# Patient Record
Sex: FEMALE | Race: BLACK OR AFRICAN AMERICAN | NOT HISPANIC OR LATINO | URBAN - METROPOLITAN AREA
[De-identification: names, ages, dates, MRNs, and addresses within clinical notes are randomized per-mention and may not be internally consistent; named-entity substitution may affect disease eponyms.]

---

## 2017-01-28 ENCOUNTER — EMERGENCY (EMERGENCY)
Facility: HOSPITAL | Age: 57
LOS: 1 days | Discharge: HOME | End: 2017-01-28
Attending: EMERGENCY MEDICINE | Admitting: EMERGENCY MEDICINE
Payer: COMMERCIAL

## 2017-01-28 VITALS
OXYGEN SATURATION: 98 % | SYSTOLIC BLOOD PRESSURE: 151 MMHG | HEART RATE: 81 BPM | DIASTOLIC BLOOD PRESSURE: 79 MMHG | RESPIRATION RATE: 17 BRPM | TEMPERATURE: 99 F

## 2017-01-28 DIAGNOSIS — G89.29 OTHER CHRONIC PAIN: ICD-10-CM
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
## 2017-01-28 DIAGNOSIS — M25.571 PAIN IN RIGHT ANKLE AND JOINTS OF RIGHT FOOT: ICD-10-CM
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
## 2017-01-28 PROCEDURE — 99283 EMERGENCY DEPT VISIT LOW MDM: CPT

## 2017-01-28 RX ORDER — IBUPROFEN 200 MG
600 TABLET ORAL ONCE
Qty: 0 | Refills: 0 | Status: COMPLETED | OUTPATIENT
Start: 2017-01-28 | End: 2017-01-28

## 2017-01-28 RX ADMIN — Medication 600 MILLIGRAM(S): at 12:45
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
## 2017-02-14 ENCOUNTER — APPOINTMENT (OUTPATIENT)
Dept: ORTHOPEDIC SURGERY | Facility: CLINIC | Age: 57
End: 2017-02-14

## 2017-04-04 ENCOUNTER — EMERGENCY (EMERGENCY)
Facility: HOSPITAL | Age: 57
LOS: 1 days | Discharge: HOME | End: 2017-04-04
Attending: EMERGENCY MEDICINE | Admitting: EMERGENCY MEDICINE
Payer: COMMERCIAL

## 2017-04-04 VITALS
DIASTOLIC BLOOD PRESSURE: 68 MMHG | RESPIRATION RATE: 16 BRPM | OXYGEN SATURATION: 98 % | HEART RATE: 103 BPM | TEMPERATURE: 98 F | SYSTOLIC BLOOD PRESSURE: 99 MMHG

## 2017-04-04 VITALS
HEART RATE: 112 BPM | RESPIRATION RATE: 20 BRPM | TEMPERATURE: 100 F | DIASTOLIC BLOOD PRESSURE: 79 MMHG | OXYGEN SATURATION: 100 % | SYSTOLIC BLOOD PRESSURE: 127 MMHG | HEIGHT: 68 IN

## 2017-04-04 DIAGNOSIS — K21.9 GASTRO-ESOPHAGEAL REFLUX DISEASE WITHOUT ESOPHAGITIS: ICD-10-CM

## 2017-04-04 DIAGNOSIS — R00.0 TACHYCARDIA, UNSPECIFIED: ICD-10-CM

## 2017-04-04 DIAGNOSIS — A09 INFECTIOUS GASTROENTERITIS AND COLITIS, UNSPECIFIED: ICD-10-CM

## 2017-04-04 DIAGNOSIS — R51 HEADACHE: ICD-10-CM

## 2017-04-04 DIAGNOSIS — G43.909 MIGRAINE, UNSPECIFIED, NOT INTRACTABLE, WITHOUT STATUS MIGRAINOSUS: ICD-10-CM

## 2017-04-04 DIAGNOSIS — I10 ESSENTIAL (PRIMARY) HYPERTENSION: ICD-10-CM

## 2017-04-04 DIAGNOSIS — Z88.5 ALLERGY STATUS TO NARCOTIC AGENT: ICD-10-CM

## 2017-04-04 DIAGNOSIS — E03.9 HYPOTHYROIDISM, UNSPECIFIED: ICD-10-CM

## 2017-04-04 LAB
ALBUMIN SERPL ELPH-MCNC: 4.1 G/DL — SIGNIFICANT CHANGE UP (ref 3.3–5)
ALP SERPL-CCNC: 77 U/L — SIGNIFICANT CHANGE UP (ref 40–120)
ALT FLD-CCNC: 15 U/L RC — SIGNIFICANT CHANGE UP (ref 10–45)
ANION GAP SERPL CALC-SCNC: 15 MMOL/L — SIGNIFICANT CHANGE UP (ref 5–17)
APTT BLD: 32.1 SEC — SIGNIFICANT CHANGE UP (ref 27.5–37.4)
AST SERPL-CCNC: 16 U/L — SIGNIFICANT CHANGE UP (ref 10–40)
BASOPHILS # BLD AUTO: 0 K/UL — SIGNIFICANT CHANGE UP (ref 0–0.2)
BASOPHILS NFR BLD AUTO: 0 % — SIGNIFICANT CHANGE UP (ref 0–2)
BILIRUB SERPL-MCNC: 0.6 MG/DL — SIGNIFICANT CHANGE UP (ref 0.2–1.2)
BUN SERPL-MCNC: 16 MG/DL — SIGNIFICANT CHANGE UP (ref 7–23)
CALCIUM SERPL-MCNC: 8.8 MG/DL — SIGNIFICANT CHANGE UP (ref 8.4–10.5)
CHLORIDE SERPL-SCNC: 99 MMOL/L — SIGNIFICANT CHANGE UP (ref 96–108)
CO2 SERPL-SCNC: 25 MMOL/L — SIGNIFICANT CHANGE UP (ref 22–31)
CREAT SERPL-MCNC: 0.77 MG/DL — SIGNIFICANT CHANGE UP (ref 0.5–1.3)
EOSINOPHIL # BLD AUTO: 0 K/UL — SIGNIFICANT CHANGE UP (ref 0–0.5)
EOSINOPHIL NFR BLD AUTO: 0.2 % — SIGNIFICANT CHANGE UP (ref 0–6)
GLUCOSE SERPL-MCNC: 152 MG/DL — HIGH (ref 70–99)
HCT VFR BLD CALC: 38.6 % — SIGNIFICANT CHANGE UP (ref 34.5–45)
HGB BLD-MCNC: 13.2 G/DL — SIGNIFICANT CHANGE UP (ref 11.5–15.5)
INR BLD: 1.22 RATIO — HIGH (ref 0.88–1.16)
LYMPHOCYTES # BLD AUTO: 0.5 K/UL — LOW (ref 1–3.3)
LYMPHOCYTES # BLD AUTO: 8.9 % — LOW (ref 13–44)
MAGNESIUM SERPL-MCNC: 1.8 MG/DL — SIGNIFICANT CHANGE UP (ref 1.6–2.6)
MCHC RBC-ENTMCNC: 31.8 PG — SIGNIFICANT CHANGE UP (ref 27–34)
MCHC RBC-ENTMCNC: 34.2 GM/DL — SIGNIFICANT CHANGE UP (ref 32–36)
MCV RBC AUTO: 93 FL — SIGNIFICANT CHANGE UP (ref 80–100)
MONOCYTES # BLD AUTO: 0.3 K/UL — SIGNIFICANT CHANGE UP (ref 0–0.9)
MONOCYTES NFR BLD AUTO: 5.3 % — SIGNIFICANT CHANGE UP (ref 2–14)
NEUTROPHILS # BLD AUTO: 5.2 K/UL — SIGNIFICANT CHANGE UP (ref 1.8–7.4)
NEUTROPHILS NFR BLD AUTO: 85.5 % — HIGH (ref 43–77)
PHOSPHATE SERPL-MCNC: 3.4 MG/DL — SIGNIFICANT CHANGE UP (ref 2.5–4.5)
PLATELET # BLD AUTO: 193 K/UL — SIGNIFICANT CHANGE UP (ref 150–400)
POTASSIUM SERPL-MCNC: 3.6 MMOL/L — SIGNIFICANT CHANGE UP (ref 3.5–5.3)
POTASSIUM SERPL-SCNC: 3.6 MMOL/L — SIGNIFICANT CHANGE UP (ref 3.5–5.3)
PROT SERPL-MCNC: 7.5 G/DL — SIGNIFICANT CHANGE UP (ref 6–8.3)
PROTHROM AB SERPL-ACNC: 13.4 SEC — HIGH (ref 9.8–12.7)
RBC # BLD: 4.16 M/UL — SIGNIFICANT CHANGE UP (ref 3.8–5.2)
RBC # FLD: 11.7 % — SIGNIFICANT CHANGE UP (ref 10.3–14.5)
SODIUM SERPL-SCNC: 139 MMOL/L — SIGNIFICANT CHANGE UP (ref 135–145)
WBC # BLD: 6.1 K/UL — SIGNIFICANT CHANGE UP (ref 3.8–10.5)
WBC # FLD AUTO: 6.1 K/UL — SIGNIFICANT CHANGE UP (ref 3.8–10.5)

## 2017-04-04 PROCEDURE — 70450 CT HEAD/BRAIN W/O DYE: CPT

## 2017-04-04 PROCEDURE — 84100 ASSAY OF PHOSPHORUS: CPT

## 2017-04-04 PROCEDURE — 70450 CT HEAD/BRAIN W/O DYE: CPT | Mod: 26

## 2017-04-04 PROCEDURE — 99285 EMERGENCY DEPT VISIT HI MDM: CPT

## 2017-04-04 PROCEDURE — 85027 COMPLETE CBC AUTOMATED: CPT

## 2017-04-04 PROCEDURE — 96375 TX/PRO/DX INJ NEW DRUG ADDON: CPT

## 2017-04-04 PROCEDURE — 80053 COMPREHEN METABOLIC PANEL: CPT

## 2017-04-04 PROCEDURE — 96374 THER/PROPH/DIAG INJ IV PUSH: CPT

## 2017-04-04 PROCEDURE — 99284 EMERGENCY DEPT VISIT MOD MDM: CPT | Mod: 25

## 2017-04-04 PROCEDURE — 83735 ASSAY OF MAGNESIUM: CPT

## 2017-04-04 PROCEDURE — 85610 PROTHROMBIN TIME: CPT

## 2017-04-04 PROCEDURE — 85730 THROMBOPLASTIN TIME PARTIAL: CPT

## 2017-04-04 RX ORDER — SODIUM CHLORIDE 9 MG/ML
2000 INJECTION INTRAMUSCULAR; INTRAVENOUS; SUBCUTANEOUS ONCE
Qty: 0 | Refills: 0 | Status: COMPLETED | OUTPATIENT
Start: 2017-04-04 | End: 2017-04-04

## 2017-04-04 RX ORDER — DIPHENHYDRAMINE HCL 50 MG
25 CAPSULE ORAL ONCE
Qty: 0 | Refills: 0 | Status: COMPLETED | OUTPATIENT
Start: 2017-04-04 | End: 2017-04-04

## 2017-04-04 RX ORDER — KETOROLAC TROMETHAMINE 30 MG/ML
15 SYRINGE (ML) INJECTION ONCE
Qty: 0 | Refills: 0 | Status: DISCONTINUED | OUTPATIENT
Start: 2017-04-04 | End: 2017-04-04

## 2017-04-04 RX ORDER — ACETAMINOPHEN 500 MG
1000 TABLET ORAL ONCE
Qty: 0 | Refills: 0 | Status: COMPLETED | OUTPATIENT
Start: 2017-04-04 | End: 2017-04-04

## 2017-04-04 RX ORDER — METOCLOPRAMIDE HCL 10 MG
10 TABLET ORAL ONCE
Qty: 0 | Refills: 0 | Status: COMPLETED | OUTPATIENT
Start: 2017-04-04 | End: 2017-04-04

## 2017-04-04 RX ORDER — ONDANSETRON 8 MG/1
4 TABLET, FILM COATED ORAL ONCE
Qty: 0 | Refills: 0 | Status: COMPLETED | OUTPATIENT
Start: 2017-04-04 | End: 2017-04-04

## 2017-04-04 RX ADMIN — SODIUM CHLORIDE 4000 MILLILITER(S): 9 INJECTION INTRAMUSCULAR; INTRAVENOUS; SUBCUTANEOUS at 20:50

## 2017-04-04 RX ADMIN — Medication 400 MILLIGRAM(S): at 21:25

## 2017-04-04 RX ADMIN — Medication 1000 MILLIGRAM(S): at 22:38

## 2017-04-04 RX ADMIN — Medication 15 MILLIGRAM(S): at 20:51

## 2017-04-04 RX ADMIN — ONDANSETRON 4 MILLIGRAM(S): 8 TABLET, FILM COATED ORAL at 20:50

## 2017-04-04 RX ADMIN — Medication 15 MILLIGRAM(S): at 21:25

## 2017-04-04 RX ADMIN — Medication 10 MILLIGRAM(S): at 20:47

## 2017-04-04 RX ADMIN — Medication 25 MILLIGRAM(S): at 20:48

## 2017-04-04 NOTE — ED PROVIDER NOTE - OBJECTIVE STATEMENT
56 year old female with PMHx of HTN, has had migraines a few times a year in the past, started to get a migraine yesterday which was typical of her usual headaches accompanied by nausea but has not responded to usual over-the-counter medications. Then today she developed 6 episodes of watery diarrhea, nonbloody, and low grade fever. No neck pain, no recent travel, no sick contacts, no blood in stool, no recent antibiotics.

## 2017-04-04 NOTE — ED PROVIDER NOTE - PLAN OF CARE
Take Tylenol 1000 mg or ibuprofen 600 mg every 6 hours with food as needed for pain. Avoid immodium. Drink plenty of fluids and get plenty of rest. Follow up with your primary doctor and with a neurologist - call Dr. Platt for an appointment. Return to the Emergency Dept if you develop any new or worsening symptoms especially inability to keep down fluids or worsening pain

## 2017-04-04 NOTE — ED PROVIDER NOTE - ATTENDING CONTRIBUTION TO CARE
56 year old female otherwise healthy with migraine that started yesterday and diarrhea with fever that started today. Well appearing on exam with tachycardia but benign abdomen and normal neuro exam. Will check labs, treat for migraine and dehydration and reassess

## 2017-04-04 NOTE — ED PROVIDER NOTE - CARE PLAN
Principal Discharge DX:	Migraine without aura and without status migrainosus, not intractable  Secondary Diagnosis:	Diarrhea of presumed infectious origin Principal Discharge DX:	Migraine without aura and without status migrainosus, not intractable  Instructions for follow-up, activity and diet:	Take Tylenol 1000 mg or ibuprofen 600 mg every 6 hours with food as needed for pain. Avoid immodium. Drink plenty of fluids and get plenty of rest. Follow up with your primary doctor and with a neurologist - call Dr. Platt for an appointment. Return to the Emergency Dept if you develop any new or worsening symptoms especially inability to keep down fluids or worsening pain  Secondary Diagnosis:	Diarrhea of presumed infectious origin

## 2017-04-04 NOTE — ED ADULT NURSE NOTE - OBJECTIVE STATEMENT
56 year old female presents to ED complaining of headache x2 days.  Heaviness to head and describes constant sharp feeling starting off at the left side of the neck radiating up the head.  Reports lightheaded, tinnitis, nausea, decreased appetite. Denies photosensitivity, aura. Reports taking Excedrin migraine, with minimal relief.  Reports "watery diarrhea" that started yesterday, today pt has had 6 episodes today. Reports no relief from Pepto Bismol.  Denies dysuria/hematuria. Reports feeling sharp intermittent abd pain around umbilicus.  Pt reports the pain to abd is "sore." Febrile on arrival. Lungs CTA unlabored, no cough, abd soft nondistended, tender to palpation. skin w/d/i. Dry oral mucosa.

## 2018-01-27 ENCOUNTER — EMERGENCY (EMERGENCY)
Facility: HOSPITAL | Age: 58
LOS: 1 days | Discharge: HOME | End: 2018-01-27
Attending: EMERGENCY MEDICINE | Admitting: EMERGENCY MEDICINE
Payer: COMMERCIAL

## 2018-01-27 VITALS
WEIGHT: 240.08 LBS | RESPIRATION RATE: 18 BRPM | DIASTOLIC BLOOD PRESSURE: 80 MMHG | HEIGHT: 70 IN | HEART RATE: 73 BPM | TEMPERATURE: 98 F | OXYGEN SATURATION: 98 % | SYSTOLIC BLOOD PRESSURE: 135 MMHG

## 2018-01-27 LAB
ALBUMIN SERPL ELPH-MCNC: 3.8 G/DL — SIGNIFICANT CHANGE UP (ref 3.3–5.2)
ALP SERPL-CCNC: 70 U/L — SIGNIFICANT CHANGE UP (ref 40–120)
ALT FLD-CCNC: 11 U/L — SIGNIFICANT CHANGE UP
ANION GAP SERPL CALC-SCNC: 13 MMOL/L — SIGNIFICANT CHANGE UP (ref 5–17)
AST SERPL-CCNC: 12 U/L — SIGNIFICANT CHANGE UP
BASOPHILS # BLD AUTO: 0 K/UL — SIGNIFICANT CHANGE UP (ref 0–0.2)
BASOPHILS NFR BLD AUTO: 0.3 % — SIGNIFICANT CHANGE UP (ref 0–2)
BILIRUB SERPL-MCNC: 0.4 MG/DL — SIGNIFICANT CHANGE UP (ref 0.4–2)
BUN SERPL-MCNC: 12 MG/DL — SIGNIFICANT CHANGE UP (ref 8–20)
CALCIUM SERPL-MCNC: 8.9 MG/DL — SIGNIFICANT CHANGE UP (ref 8.6–10.2)
CHLORIDE SERPL-SCNC: 102 MMOL/L — SIGNIFICANT CHANGE UP (ref 98–107)
CO2 SERPL-SCNC: 27 MMOL/L — SIGNIFICANT CHANGE UP (ref 22–29)
CREAT SERPL-MCNC: 0.53 MG/DL — SIGNIFICANT CHANGE UP (ref 0.5–1.3)
EOSINOPHIL # BLD AUTO: 0.2 K/UL — SIGNIFICANT CHANGE UP (ref 0–0.5)
EOSINOPHIL NFR BLD AUTO: 2.6 % — SIGNIFICANT CHANGE UP (ref 0–6)
GLUCOSE SERPL-MCNC: 164 MG/DL — HIGH (ref 70–115)
HCT VFR BLD CALC: 36.8 % — LOW (ref 37–47)
HGB BLD-MCNC: 11.8 G/DL — LOW (ref 12–16)
LYMPHOCYTES # BLD AUTO: 2.2 K/UL — SIGNIFICANT CHANGE UP (ref 1–4.8)
LYMPHOCYTES # BLD AUTO: 32.2 % — SIGNIFICANT CHANGE UP (ref 20–55)
MAGNESIUM SERPL-MCNC: 1.9 MG/DL — SIGNIFICANT CHANGE UP (ref 1.6–2.6)
MCHC RBC-ENTMCNC: 30.5 PG — SIGNIFICANT CHANGE UP (ref 27–31)
MCHC RBC-ENTMCNC: 32.1 G/DL — SIGNIFICANT CHANGE UP (ref 32–36)
MCV RBC AUTO: 95.1 FL — SIGNIFICANT CHANGE UP (ref 81–99)
MONOCYTES # BLD AUTO: 0.3 K/UL — SIGNIFICANT CHANGE UP (ref 0–0.8)
MONOCYTES NFR BLD AUTO: 4.8 % — SIGNIFICANT CHANGE UP (ref 3–10)
NEUTROPHILS # BLD AUTO: 4.1 K/UL — SIGNIFICANT CHANGE UP (ref 1.8–8)
NEUTROPHILS NFR BLD AUTO: 60 % — SIGNIFICANT CHANGE UP (ref 37–73)
PLATELET # BLD AUTO: 226 K/UL — SIGNIFICANT CHANGE UP (ref 150–400)
POTASSIUM SERPL-MCNC: 3.5 MMOL/L — SIGNIFICANT CHANGE UP (ref 3.5–5.3)
POTASSIUM SERPL-SCNC: 3.5 MMOL/L — SIGNIFICANT CHANGE UP (ref 3.5–5.3)
PROT SERPL-MCNC: 6.9 G/DL — SIGNIFICANT CHANGE UP (ref 6.6–8.7)
RBC # BLD: 3.87 M/UL — LOW (ref 4.4–5.2)
RBC # FLD: 12.1 % — SIGNIFICANT CHANGE UP (ref 11–15.6)
SODIUM SERPL-SCNC: 142 MMOL/L — SIGNIFICANT CHANGE UP (ref 135–145)
WBC # BLD: 6.9 K/UL — SIGNIFICANT CHANGE UP (ref 4.8–10.8)
WBC # FLD AUTO: 6.9 K/UL — SIGNIFICANT CHANGE UP (ref 4.8–10.8)

## 2018-01-27 PROCEDURE — 72131 CT LUMBAR SPINE W/O DYE: CPT

## 2018-01-27 PROCEDURE — 36415 COLL VENOUS BLD VENIPUNCTURE: CPT

## 2018-01-27 PROCEDURE — 93971 EXTREMITY STUDY: CPT | Mod: 26,LT

## 2018-01-27 PROCEDURE — 70450 CT HEAD/BRAIN W/O DYE: CPT

## 2018-01-27 PROCEDURE — 80053 COMPREHEN METABOLIC PANEL: CPT

## 2018-01-27 PROCEDURE — 70450 CT HEAD/BRAIN W/O DYE: CPT | Mod: 26

## 2018-01-27 PROCEDURE — 83735 ASSAY OF MAGNESIUM: CPT

## 2018-01-27 PROCEDURE — 99284 EMERGENCY DEPT VISIT MOD MDM: CPT | Mod: 25

## 2018-01-27 PROCEDURE — 93971 EXTREMITY STUDY: CPT

## 2018-01-27 PROCEDURE — 72131 CT LUMBAR SPINE W/O DYE: CPT | Mod: 26

## 2018-01-27 PROCEDURE — 99285 EMERGENCY DEPT VISIT HI MDM: CPT

## 2018-01-27 PROCEDURE — 85027 COMPLETE CBC AUTOMATED: CPT

## 2018-01-27 NOTE — ED ADULT TRIAGE NOTE - CHIEF COMPLAINT QUOTE
pt alert and awake x3 arrived with numbness in left lower leg numbness x1 week and numbness in finger tips with slight HA

## 2018-01-27 NOTE — ED PROVIDER NOTE - OBJECTIVE STATEMENT
57 year old female with PMH HTn and hypothyroid presents with paresthesias and numbness. Sx started 1 week ago in her left leg below the knee, intermittent, no radiation. She reports that Sx improve with walking and no weakness, HA, fever, bowel/bladder incontinence/retention. She does reports that she has had mild swelling to the leg and varicose veins that are new fo the past few weeks.

## 2018-01-27 NOTE — ED PROVIDER NOTE - MEDICAL DECISION MAKING DETAILS
Pt with strength intact, normal sensation and reflexes on exam, steady gait. CTH and L spine neg, labs normal, no DVT, well appearing and stable for dc with neuro follow up.  Pt with no abd pain, weight loss, or vaginal bleeding. Gave her reults of CT which demonstrated pelvic mass and advised urgent outpt f/u

## 2018-01-27 NOTE — ED ADULT NURSE NOTE - OBJECTIVE STATEMENT
numbness tingling intermittent to all 4 extremities w/o weakness. pt denies recent trauma. reports has been dealing with this issue for approx 1 month. pt has no other complaints. a and o x3. breathing even and unlabored. will continue to monitor.

## 2018-01-27 NOTE — ED ADULT NURSE NOTE - CHPI ED SYMPTOMS NEG
no weakness/no nausea/no pain/no dizziness/no fever/no vomiting/no chills/no decreased eating/drinking

## 2019-07-01 NOTE — ED ADULT NURSE NOTE - BREATH SOUNDS, MLM
Clear Acitretin Pregnancy And Lactation Text: This medication is Pregnancy Category X and should not be given to women who are pregnant or may become pregnant in the future. This medication is excreted in breast milk.

## 2019-07-20 ENCOUNTER — EMERGENCY (EMERGENCY)
Facility: HOSPITAL | Age: 59
LOS: 1 days | Discharge: HOME | End: 2019-07-20
Attending: EMERGENCY MEDICINE
Payer: COMMERCIAL

## 2019-07-20 VITALS
WEIGHT: 225.09 LBS | HEART RATE: 92 BPM | RESPIRATION RATE: 18 BRPM | TEMPERATURE: 98 F | SYSTOLIC BLOOD PRESSURE: 154 MMHG | DIASTOLIC BLOOD PRESSURE: 90 MMHG | OXYGEN SATURATION: 99 % | HEIGHT: 68 IN

## 2019-07-20 DIAGNOSIS — N94.9 UNSPECIFIED CONDITION ASSOCIATED WITH FEMALE GENITAL ORGANS AND MENSTRUAL CYCLE: ICD-10-CM

## 2019-07-20 PROBLEM — K21.9 GASTRO-ESOPHAGEAL REFLUX DISEASE WITHOUT ESOPHAGITIS: Chronic | Status: ACTIVE | Noted: 2017-01-28

## 2019-07-20 LAB
ALBUMIN SERPL ELPH-MCNC: 4.1 G/DL — SIGNIFICANT CHANGE UP (ref 3.3–5)
ALP SERPL-CCNC: 63 U/L — SIGNIFICANT CHANGE UP (ref 40–120)
ALT FLD-CCNC: 10 U/L — SIGNIFICANT CHANGE UP (ref 10–45)
ANION GAP SERPL CALC-SCNC: 14 MMOL/L — SIGNIFICANT CHANGE UP (ref 5–17)
APPEARANCE UR: CLEAR — SIGNIFICANT CHANGE UP
APTT BLD: 29.7 SEC — SIGNIFICANT CHANGE UP (ref 27.5–36.3)
AST SERPL-CCNC: 11 U/L — SIGNIFICANT CHANGE UP (ref 10–40)
BACTERIA # UR AUTO: NEGATIVE — SIGNIFICANT CHANGE UP
BASOPHILS # BLD AUTO: 0 K/UL — SIGNIFICANT CHANGE UP (ref 0–0.2)
BASOPHILS NFR BLD AUTO: 0 % — SIGNIFICANT CHANGE UP (ref 0–2)
BILIRUB SERPL-MCNC: 0.4 MG/DL — SIGNIFICANT CHANGE UP (ref 0.2–1.2)
BILIRUB UR-MCNC: NEGATIVE — SIGNIFICANT CHANGE UP
BLD GP AB SCN SERPL QL: NEGATIVE — SIGNIFICANT CHANGE UP
BUN SERPL-MCNC: 17 MG/DL — SIGNIFICANT CHANGE UP (ref 7–23)
CALCIUM SERPL-MCNC: 8.8 MG/DL — SIGNIFICANT CHANGE UP (ref 8.4–10.5)
CHLORIDE SERPL-SCNC: 101 MMOL/L — SIGNIFICANT CHANGE UP (ref 96–108)
CO2 SERPL-SCNC: 26 MMOL/L — SIGNIFICANT CHANGE UP (ref 22–31)
COLOR SPEC: SIGNIFICANT CHANGE UP
CREAT SERPL-MCNC: 0.72 MG/DL — SIGNIFICANT CHANGE UP (ref 0.5–1.3)
DIFF PNL FLD: NEGATIVE — SIGNIFICANT CHANGE UP
EOSINOPHIL # BLD AUTO: 0.1 K/UL — SIGNIFICANT CHANGE UP (ref 0–0.5)
EOSINOPHIL NFR BLD AUTO: 1.8 % — SIGNIFICANT CHANGE UP (ref 0–6)
EPI CELLS # UR: 0 /HPF — SIGNIFICANT CHANGE UP
GAS PNL BLDV: SIGNIFICANT CHANGE UP
GLUCOSE SERPL-MCNC: 158 MG/DL — HIGH (ref 70–99)
GLUCOSE UR QL: NEGATIVE — SIGNIFICANT CHANGE UP
HCG UR QL: NEGATIVE — SIGNIFICANT CHANGE UP
HCT VFR BLD CALC: 38.5 % — SIGNIFICANT CHANGE UP (ref 34.5–45)
HGB BLD-MCNC: 12.9 G/DL — SIGNIFICANT CHANGE UP (ref 11.5–15.5)
HYALINE CASTS # UR AUTO: 0 /LPF — SIGNIFICANT CHANGE UP (ref 0–2)
INR BLD: 1.03 RATIO — SIGNIFICANT CHANGE UP (ref 0.88–1.16)
KETONES UR-MCNC: NEGATIVE — SIGNIFICANT CHANGE UP
LEUKOCYTE ESTERASE UR-ACNC: NEGATIVE — SIGNIFICANT CHANGE UP
LIDOCAIN IGE QN: 16 U/L — SIGNIFICANT CHANGE UP (ref 7–60)
LYMPHOCYTES # BLD AUTO: 1.9 K/UL — SIGNIFICANT CHANGE UP (ref 1–3.3)
LYMPHOCYTES # BLD AUTO: 30.2 % — SIGNIFICANT CHANGE UP (ref 13–44)
MCHC RBC-ENTMCNC: 31.5 PG — SIGNIFICANT CHANGE UP (ref 27–34)
MCHC RBC-ENTMCNC: 33.4 GM/DL — SIGNIFICANT CHANGE UP (ref 32–36)
MCV RBC AUTO: 94.2 FL — SIGNIFICANT CHANGE UP (ref 80–100)
MONOCYTES # BLD AUTO: 0.4 K/UL — SIGNIFICANT CHANGE UP (ref 0–0.9)
MONOCYTES NFR BLD AUTO: 7 % — SIGNIFICANT CHANGE UP (ref 2–14)
NEUTROPHILS # BLD AUTO: 3.9 K/UL — SIGNIFICANT CHANGE UP (ref 1.8–7.4)
NEUTROPHILS NFR BLD AUTO: 61 % — SIGNIFICANT CHANGE UP (ref 43–77)
NITRITE UR-MCNC: NEGATIVE — SIGNIFICANT CHANGE UP
PH UR: 6.5 — SIGNIFICANT CHANGE UP (ref 5–8)
PLATELET # BLD AUTO: 263 K/UL — SIGNIFICANT CHANGE UP (ref 150–400)
POTASSIUM SERPL-MCNC: 3.7 MMOL/L — SIGNIFICANT CHANGE UP (ref 3.5–5.3)
POTASSIUM SERPL-SCNC: 3.7 MMOL/L — SIGNIFICANT CHANGE UP (ref 3.5–5.3)
PROT SERPL-MCNC: 7 G/DL — SIGNIFICANT CHANGE UP (ref 6–8.3)
PROT UR-MCNC: NEGATIVE — SIGNIFICANT CHANGE UP
PROTHROM AB SERPL-ACNC: 11.8 SEC — SIGNIFICANT CHANGE UP (ref 10–12.9)
RBC # BLD: 4.09 M/UL — SIGNIFICANT CHANGE UP (ref 3.8–5.2)
RBC # FLD: 11.2 % — SIGNIFICANT CHANGE UP (ref 10.3–14.5)
RBC CASTS # UR COMP ASSIST: 1 /HPF — SIGNIFICANT CHANGE UP (ref 0–4)
RH IG SCN BLD-IMP: POSITIVE — SIGNIFICANT CHANGE UP
SODIUM SERPL-SCNC: 141 MMOL/L — SIGNIFICANT CHANGE UP (ref 135–145)
SP GR SPEC: 1.02 — SIGNIFICANT CHANGE UP (ref 1.01–1.02)
UROBILINOGEN FLD QL: NEGATIVE — SIGNIFICANT CHANGE UP
WBC # BLD: 6.4 K/UL — SIGNIFICANT CHANGE UP (ref 3.8–10.5)
WBC # FLD AUTO: 6.4 K/UL — SIGNIFICANT CHANGE UP (ref 3.8–10.5)
WBC UR QL: 0 /HPF — SIGNIFICANT CHANGE UP (ref 0–5)

## 2019-07-20 PROCEDURE — 74177 CT ABD & PELVIS W/CONTRAST: CPT | Mod: 26

## 2019-07-20 PROCEDURE — 93975 VASCULAR STUDY: CPT | Mod: 26

## 2019-07-20 PROCEDURE — 76856 US EXAM PELVIC COMPLETE: CPT | Mod: 26,59

## 2019-07-20 PROCEDURE — 93976 VASCULAR STUDY: CPT | Mod: 26,59

## 2019-07-20 PROCEDURE — 99284 EMERGENCY DEPT VISIT MOD MDM: CPT

## 2019-07-20 PROCEDURE — 99244 OFF/OP CNSLTJ NEW/EST MOD 40: CPT

## 2019-07-20 PROCEDURE — 76830 TRANSVAGINAL US NON-OB: CPT | Mod: 26

## 2019-07-20 RX ORDER — ACETAMINOPHEN 500 MG
975 TABLET ORAL ONCE
Refills: 0 | Status: COMPLETED | OUTPATIENT
Start: 2019-07-20 | End: 2019-07-20

## 2019-07-20 RX ORDER — SODIUM CHLORIDE 9 MG/ML
1000 INJECTION, SOLUTION INTRAVENOUS ONCE
Refills: 0 | Status: COMPLETED | OUTPATIENT
Start: 2019-07-20 | End: 2019-07-20

## 2019-07-20 RX ADMIN — SODIUM CHLORIDE 1000 MILLILITER(S): 9 INJECTION, SOLUTION INTRAVENOUS at 13:35

## 2019-07-20 RX ADMIN — Medication 975 MILLIGRAM(S): at 13:35

## 2019-07-20 NOTE — ED PROVIDER NOTE - OBJECTIVE STATEMENT
59 year old female with PMHx of fibroids, HTN pw cc of flank pain    R flank pain radiating to the front  No hx of kidney stone.   No n/V/D. No burning on urination, no increased or decreased frequency. no blood in urine.  Menopausal for 15+ years.  Took ibuprofen this Am. 59 year old female with PMHx of fibroids, HTN pw cc of flank pain    R flank pain radiating to the front, waxing and waning.   No hx of kidney stone.   No n/V/D. No burning on urination, no increased or decreased frequency. no blood in urine.  Menopausal for 15+ years.  Took ibuprofen this Am.  No surgeries.

## 2019-07-20 NOTE — ED ADULT NURSE REASSESSMENT NOTE - NS ED NURSE REASSESS COMMENT FT1
Pt received from [facility]. Pt A&O x 3. Denies pain or any other symptom currently. Waiting for OBGYN recs and dispo. Family at bedside.

## 2019-07-20 NOTE — ED ADULT NURSE NOTE - OBJECTIVE STATEMENT
60 y/o female PMH HTN presents to ED reporting R sided flank pain. Pt reports pain began today, 10/10, R flank, radiating to RLQ of abdomen. On exam, AOx3, speaking in complete sentences. Lung sounds CTA, NAD. Abdomen soft, tender RLQ, non-distended. Pt denies CP, SOB, n/v/d, fever/chills and urinary symptoms at this time. Heplock placed, labs sent. Awaiting US at this time, pt aware of plan of care at this time.

## 2019-07-20 NOTE — ED PROVIDER NOTE - NSFOLLOWUPINSTRUCTIONS_ED_ALL_ED_FT
You were evaluated in the ER at [facility] for abdominal pain. The ultrasound and CT scan of your pelvis showed a large right sided adnexal mass and good blood flow to your ovaries. Your lab results were within normal limits. You were seen by the gynecology service in the hospital who recommended outpatient follow up with your gynecologist for the right adnexal mass.     - Please return to the ER if you develop worsening abdominal pain, nausea/vomiting, fevers/chills, inability to pass flatus or have bowel movements, shortness of breath, lightheadedness/dizziness.     - Please follow up with your gynecologist in the office this week. You were evaluated in the ER at [facility] for abdominal pain. The ultrasound and CT scan of your pelvis showed a large right sided adnexal mass and good blood flow to your ovaries. Your lab results were within normal limits. You were seen by the gynecology service in the hospital who recommended outpatient follow up with your gynecologist for the right adnexal mass.     - Please return to the ER if you develop worsening abdominal pain, nausea/vomiting, fevers/chills, inability to pass flatus or have bowel movements, shortness of breath, lightheadedness/dizziness.     - Please follow up with your gynecologist in the office this week.    - Please excuse Ms. Snowden from her work duties until thursday 7/25/19 so she may have appropriate outpatient medical evaluation this week

## 2019-07-20 NOTE — ED ADULT NURSE NOTE - NSIMPLEMENTINTERV_GEN_ALL_ED
Implemented All Universal Safety Interventions:  [place] to call system. Call bell, personal items and telephone within reach. Instruct patient to call for assistance. Room bathroom lighting operational. Non-slip footwear when patient is off stretcher. Physically safe environment: no spills, clutter or unnecessary equipment. Stretcher in lowest position, wheels locked, appropriate side rails in place.

## 2019-07-20 NOTE — CONSULT NOTE ADULT - SUBJECTIVE AND OBJECTIVE BOX
HPI    59y postmenopausal woman (LMP 20y ago) who presents w/ RLQ pain since Thursday. States pain first began Thursday, sharp, intermittent in nature. Took motrin, improved. Then upon waking up this am (~6a), increasingly painful, 10/10. On further questioning states experienced a similar pain, however much more mild and more intermittent x past few months. Denies any associated N/V, fevers, chills, SOB, CP, VB, etc. Last ate 7p last night.     OBGYN Provider: Patient unsure of name, Located in [place]    OB/GYN HISTORY: Fibroids  PMH:Acid reflux, Hyperthyroid, HTN (Hypertension)  PSH: Denies  Rx: Methimazole, Hydrochorothiazide, Norvasc  Allergies: NKDA  Intolerance: Percocet 10/325 (N/V, dizziness)  Psych: Denies  Social: Denies      CONSTITUTIONAL: No weakness, fevers or chills  EYES/ENT: No visual changes;  No vertigo or throat pain   NECK: No pain or stiffness  RESPIRATORY: No cough, wheezing, hemoptysis; No shortness of breath  CARDIOVASCULAR: No chest pain or palpitations  GASTROINTESTINAL: No abdominal or epigastric pain. No nausea, vomiting, or hematemesis; No diarrhea or constipation. No melena or hematochezia.  GENITOURINARY: No dysuria, frequency or hematuria  NEUROLOGICAL: No numbness or weakness  SKIN: No itching, burning, rashes, or lesions   All other review of systems is negative unless indicated above.    Vital Signs Last 24 Hrs  T(C): 36.6 (2019 19:34), Max: 36.7 (2019 12:37)  T(F): 97.8 (2019 19:34), Max: 98 (2019 12:37)  HR: 61 (2019 19:34) (61 - 92)  BP: 134/82 (2019 19:34) (125/80 - 154/90)  BP(mean): --  RR: 20 (2019 19:34) (18 - 20)  SpO2: 98% (2019 19:34) (98% - 99%)    PHYSICAL EXAM:  Constitutional: alert and oriented x 3  Respiratory: CTA bilaterally  Cardiovascular: regular rate and rhythm  Gastrointestinal: soft, minimally tender to palpation in RLQ, No rebound or guarding  Genitourinary: NEFG  Cervix: closed/ long, no CMT  Uterus: normal size, non tender  Adnexa: L adnexa minimally tender, no palpable masses      LABS:                        12.9   6.4   )-----------( 263      ( 2019 13:36 )             38.5     07-20    141  |  101  |  17  ----------------------------<  158<H>  3.7   |  26  |  0.72    Ca    8.8      2019 13:36    TPro  7.0  /  Alb  4.1  /  TBili  0.4  /  DBili  x   /  AST  11  /  ALT  10  /  AlkPhos  63      PT/INR - ( 2019 13:36 )   PT: 11.8 sec;   INR: 1.03 ratio         PTT - ( 2019 13:36 )  PTT:29.7 sec  Urinalysis Basic - ( 2019 15:10 )    Color: Light Yellow / Appearance: Clear / S.019 / pH: x  Gluc: x / Ketone: Negative  / Bili: Negative / Urobili: Negative   Blood: x / Protein: Negative / Nitrite: Negative   Leuk Esterase: Negative / RBC: 1 /hpf / WBC 0 /HPF   Sq Epi: x / Non Sq Epi: 0 /hpf / Bacteria: Negative        Blood Type: B Positive      RADIOLOGY & ADDITIONAL STUDIES:    EXAM:  US TRANSVAGINAL                          EXAM:  US DPLX PELVIC                            PROCEDURE DATE:  2019            INTERPRETATION:  CLINICAL INFORMATION: Right lower quadrant abdominal   pain. Evaluate for torsion versus fibroid. Postmenopausal. Right pelvic   pain.  LMP: Unknown    COMPARISON: Pelvic sonogram from 2010.    TECHNIQUE:     Endovaginal pelvic sonogram as per order. Transabdominal pelvic sonogram   was also performed as part of our standard protocol. Color and Spectral   Doppler was performed.    FINDINGS:    Uterus: 5.4 x 2.9 x 4.7 cm. Within normal limits.    Endometrium: 3 mm. Within normal limits.    Right ovary: Not visualized.    Left ovary: 0.7 x 1.1 x 0.6 cm. Normal arterial and venous waveforms.    Fluid: Moderate volume of pelvic free fluid with debris.    IMPRESSION:    Moderate volume pelvic free fluid limits evaluation.    Right ovary was not visualized.                DEVAN ORR M.D., RADIOLOGY RESIDENT  This document has been electronically signed.  CLAU MANCINI M.D., ATTENDING RADIOLOGIST  This document has been electronically signed. 2019  2:52PM      EXAM:  CT ABDOMEN AND PELVIS IC                            PROCEDURE DATE:  2019            INTERPRETATION:  CLINICAL INFORMATION: Right-sided pain    COMPARISON: Ultrasound of same date    PROCEDURE:   CT of the Abdomen and Pelvis was performed with intravenous contrast.   Intravenous contrast: 90 ml Omnipaque 350. 10 ml discarded.  Oral contrast: None.  Sagittal and coronal reformats were performed.    FINDINGS:    LOWER CHEST: Within normal limits.    LIVER: Within normal limits. Small amount of perihepatic ascites noted.  BILE DUCTS: Normal caliber.  GALLBLADDER: Within normal limits.  SPLEEN: Within normal limits.  PANCREAS: Within normal limits.  ADRENALS: Within normal limits.  KIDNEYS/URETERS: Bilateral renal hypodensities noted which are too small   to characterize. No stones or hydronephrosis.    BLADDER: Within normal limits.  REPRODUCTIVE ORGANS: Right adnexal mass measuring approximately 9.3 x 9.7   cm. Small amount of pelvic ascites noted.    BOWEL: No bowel obstruction. Appendix normal  PERITONEUM: No ascites.  VESSELS:  Within normal limits.  RETROPERITONEUM: No lymphadenopathy.    ABDOMINAL WALL: Within normal limits.  BONES: Within normal limits.    IMPRESSION:     Normal appendix    9.3 x 9.7 cm right adnexal mass. Differential of this includes ovarian   mass or torsed right ovary given history of right lower quadrant pain.    The findings were discussed with Dr. Rubin at time of final signing.                     CLAU MANCINI M.D., ATTENDING RADIOLOGIST  This document has been electronically signed. 2019  4:14PM

## 2019-07-20 NOTE — ED ADULT NURSE REASSESSMENT NOTE - NS ED NURSE REASSESS COMMENT FT1
Pt requesting to eat at this time, advised not to at this time per RN. Awaiting CT scan at this time, pt aware of plan of care at this time.

## 2019-07-20 NOTE — ED PROVIDER NOTE - ATTENDING CONTRIBUTION TO CARE
I have seen and evaluated this patient with the resident.   I agree with the findings  unless other wise stated.  I have made appropriate changes in documentations where needed, After my face to face bedside evaluation, I am further  [age] year old female with PMHx of fibroids, HTN pw cc of flank pain R flank pain radiating to the front, waxing and waning. No hx of kidney stone. No n/V/D. No burning on urination, no increased or decreased frequency. no blood in urine. Menopausal for 15+ years.  Took ibuprofen this Am. No surgeries. abdomen tender RLQ mass palpable RLQ No cva tenderness see detailed exam concern for ovarian/uterine problem v/s appendicitis labs US reviewed will get CT abdomen GYN eval outpatient f/u --Parsons

## 2019-07-20 NOTE — ED PROVIDER NOTE - CARE PLAN
Principal Discharge DX:	Adnexal mass  Assessment and plan of treatment:	59 year old female with PMHx of fibroids, HTN pw cc of flank pain. In context of large fibroid in past likely 2/2 fibroid, will get TVUS, labs, pain control and reassess. vss clinically stable

## 2019-07-20 NOTE — CONSULT NOTE ADULT - ASSESSMENT
59y postmenopausal woman (LMP 20y ago) who presents w/ RLQ pain since Thursday. VSS and wnl. PE significant for mild TTP in RLQ, however no peritoneal signs. Imaging significant for R adnexal mass (9.3x9.7cm). Cannot r/o torsion.

## 2019-07-20 NOTE — CONSULT NOTE ADULT - ATTENDING COMMENTS
ATTG on service     Pt seen with PGY2 and PGY4 - agree with above plan and mngt    In summary, pt is 58 yo postmenopausal x 17 yrs presents with sudden onset RLQ pain - started 3 days ago but became worse today 10/10 - intermittent, not relieved with Motrin.  Reports similar milder pain started months ago but no w/u or eval.  No prior contributing hx.  No associated sxs - denies anorexia, constipation, f/c, urinary sxs, vaginal bleeding.  Mild nasuea - NPO since 7pm last night.  Remote h/O small myoma - no medical or surgical mngt.     VSS, afebrile  NAD  ABd-soft, nd, nt, +RLQ TTP    Hct 38  TVS-as above - unable to see right adnexa, moderate pelvic fluid  CT Abd pelvis - large 9 cm right adnexal mass, small pelvic ascites    a/p:58 yo postmenopausal with new onset RLQ pain and newly found large adnexal mass.  Pain controlled for last 7 hrs with Tylenol only.  No acute abdomen.  Hct stable on admission.  TVS - unable to assess right adenxa - cannot r/o torsion since not seen.  Moderate fluid.  -recommend rpt Hct to see if signs of ruptured cyst  -rpt u/s - both abdominal and vaginal to assess right adnexa with Dopplers  -call for rpt exam if pain meds requested    -if no signs of torsion and pain still controlled with po - can dc pt to home for GYN f/u and surgery with w/u for adnexal mass  -if pain returns or Hct drops or signs of torsion - then will consider urgent surgical intervention   -GYN will cont to f/u    JESUS Santana

## 2019-07-20 NOTE — ED PROVIDER NOTE - NS ED ROS FT
CONSTITUTIONAL: No fevers, no chills  Eyes: No vision changes  Cardiovascular: No Chest pain  Respiratory: No SOB  Gastrointestinal: refer to HPI  Genitourinary: no dysuria, no hematuria  SKIN: no rashes.  NEURO: no headache, no weakness or numbness  PSYCHIATRIC: no known mental health issues.

## 2019-07-20 NOTE — ED PROVIDER NOTE - PHYSICAL EXAMINATION
General: well appearing, interactive, well nourished, NAD  HEENT: pupils equal and reactive, normal oropharynx, normal external ears bilaterally   Cardiac: RRR, no MRG appreciated  Resp: lungs clear to auscultation bilaterally, symmetric chest wall rise  Abd: soft, ttp R lower pelvis area, nondistended, normoactive bowel sounds  : no CVA tenderness  Neuro: Moving all extremities  Skin:  normal color for race  Pelvic exam performed with chaperone Eaton: minimal amount of discharge, no CMT tenderness, some R adnexal tenderness

## 2019-07-20 NOTE — ED PROVIDER NOTE - PLAN OF CARE
59 year old female with PMHx of fibroids, HTN pw cc of flank pain. In context of large fibroid in past likely 2/2 fibroid, will get TVUS, labs, pain control and reassess. vss clinically stable

## 2019-07-20 NOTE — ED PROVIDER NOTE - PROGRESS NOTE DETAILS
Repeat US demosntrating flow to the R ovary. RLQ pain much improved after tylenol and now non-tender on exam. GYN was conatcted and returned to bedside to al. Daniel dc home with outpatient f/u with her GYN.

## 2019-07-20 NOTE — CONSULT NOTE ADULT - PROBLEM SELECTOR RECOMMENDATION 9
Patient to be further evaluated by  prior to final recommendations.    Prabha Cummings, PGY2  D/W Dr. Santana

## 2019-07-21 VITALS
DIASTOLIC BLOOD PRESSURE: 58 MMHG | TEMPERATURE: 98 F | HEART RATE: 60 BPM | OXYGEN SATURATION: 97 % | RESPIRATION RATE: 18 BRPM | SYSTOLIC BLOOD PRESSURE: 117 MMHG

## 2019-07-21 LAB
BASOPHILS # BLD AUTO: 0 K/UL — SIGNIFICANT CHANGE UP (ref 0–0.2)
BASOPHILS NFR BLD AUTO: 0.1 % — SIGNIFICANT CHANGE UP (ref 0–2)
CULTURE RESULTS: SIGNIFICANT CHANGE UP
EOSINOPHIL # BLD AUTO: 0.2 K/UL — SIGNIFICANT CHANGE UP (ref 0–0.5)
EOSINOPHIL NFR BLD AUTO: 2.8 % — SIGNIFICANT CHANGE UP (ref 0–6)
HCT VFR BLD CALC: 34.8 % — SIGNIFICANT CHANGE UP (ref 34.5–45)
HGB BLD-MCNC: 11.6 G/DL — SIGNIFICANT CHANGE UP (ref 11.5–15.5)
LYMPHOCYTES # BLD AUTO: 2.2 K/UL — SIGNIFICANT CHANGE UP (ref 1–3.3)
LYMPHOCYTES # BLD AUTO: 39.6 % — SIGNIFICANT CHANGE UP (ref 13–44)
MCHC RBC-ENTMCNC: 31.3 PG — SIGNIFICANT CHANGE UP (ref 27–34)
MCHC RBC-ENTMCNC: 33.1 GM/DL — SIGNIFICANT CHANGE UP (ref 32–36)
MCV RBC AUTO: 94.6 FL — SIGNIFICANT CHANGE UP (ref 80–100)
MONOCYTES # BLD AUTO: 0.4 K/UL — SIGNIFICANT CHANGE UP (ref 0–0.9)
MONOCYTES NFR BLD AUTO: 6.8 % — SIGNIFICANT CHANGE UP (ref 2–14)
NEUTROPHILS # BLD AUTO: 2.8 K/UL — SIGNIFICANT CHANGE UP (ref 1.8–7.4)
NEUTROPHILS NFR BLD AUTO: 50.7 % — SIGNIFICANT CHANGE UP (ref 43–77)
PLATELET # BLD AUTO: 221 K/UL — SIGNIFICANT CHANGE UP (ref 150–400)
RBC # BLD: 3.69 M/UL — LOW (ref 3.8–5.2)
RBC # FLD: 11.3 % — SIGNIFICANT CHANGE UP (ref 10.3–14.5)
SPECIMEN SOURCE: SIGNIFICANT CHANGE UP
WBC # BLD: 5.5 K/UL — SIGNIFICANT CHANGE UP (ref 3.8–10.5)
WBC # FLD AUTO: 5.5 K/UL — SIGNIFICANT CHANGE UP (ref 3.8–10.5)

## 2019-07-21 PROCEDURE — 80053 COMPREHEN METABOLIC PANEL: CPT

## 2019-07-21 PROCEDURE — 85730 THROMBOPLASTIN TIME PARTIAL: CPT

## 2019-07-21 PROCEDURE — 85027 COMPLETE CBC AUTOMATED: CPT

## 2019-07-21 PROCEDURE — 82330 ASSAY OF CALCIUM: CPT

## 2019-07-21 PROCEDURE — 76830 TRANSVAGINAL US NON-OB: CPT

## 2019-07-21 PROCEDURE — 74177 CT ABD & PELVIS W/CONTRAST: CPT

## 2019-07-21 PROCEDURE — 81025 URINE PREGNANCY TEST: CPT

## 2019-07-21 PROCEDURE — 81001 URINALYSIS AUTO W/SCOPE: CPT

## 2019-07-21 PROCEDURE — 84132 ASSAY OF SERUM POTASSIUM: CPT

## 2019-07-21 PROCEDURE — 82947 ASSAY GLUCOSE BLOOD QUANT: CPT

## 2019-07-21 PROCEDURE — 83690 ASSAY OF LIPASE: CPT

## 2019-07-21 PROCEDURE — 76856 US EXAM PELVIC COMPLETE: CPT

## 2019-07-21 PROCEDURE — 86850 RBC ANTIBODY SCREEN: CPT

## 2019-07-21 PROCEDURE — 82803 BLOOD GASES ANY COMBINATION: CPT

## 2019-07-21 PROCEDURE — 86900 BLOOD TYPING SEROLOGIC ABO: CPT

## 2019-07-21 PROCEDURE — 86901 BLOOD TYPING SEROLOGIC RH(D): CPT

## 2019-07-21 PROCEDURE — 87086 URINE CULTURE/COLONY COUNT: CPT

## 2019-07-21 PROCEDURE — 85610 PROTHROMBIN TIME: CPT

## 2019-07-21 PROCEDURE — 99284 EMERGENCY DEPT VISIT MOD MDM: CPT

## 2019-07-21 PROCEDURE — 84295 ASSAY OF SERUM SODIUM: CPT

## 2019-07-21 PROCEDURE — 82435 ASSAY OF BLOOD CHLORIDE: CPT

## 2019-07-21 PROCEDURE — 93975 VASCULAR STUDY: CPT

## 2019-07-21 PROCEDURE — 85014 HEMATOCRIT: CPT

## 2019-07-21 PROCEDURE — 83605 ASSAY OF LACTIC ACID: CPT

## 2019-07-21 NOTE — CHART NOTE - NSCHARTNOTEFT_GEN_A_CORE
Patient had a repeat CBC and TAUS. CBC stable status post 1L bolus. TAUS visualizes the adnexal mass previously seen on CT, normal flow to right ovary. Patient has not required anymore pain meds since admission. VSS. Findings discussed with patient and her family at [facility]. Advised patient it is concerning for a mass to be this large in a person at her age. However, given the findings and the resolution of her pain, emergent surgery is not indicated at this time. Patient is aware she will require further work-up outpatient for potential malignancy, including but not limited to Tumor markers, possible MRI, and consultation with a GYN oncologist. Plan for surgery to be established during work-up.    All questions answered. Patient to see Dr. Huffman in the office on Monday evening or Tuesday night.     Patient seen w/ Dr. Bala Tasi PGY-2 Patient had a repeat CBC and TAUS. CBC stable status post 1L bolus. TAUS visualizes the adnexal mass previously seen on CT, normal flow to right ovary. Patient has not required anymore pain meds since admission. VSS. Findings discussed with patient and her family at [facility]. Advised patient it is concerning for a mass to be this large in a person at her age. However, given the findings and the resolution of her pain, emergent surgery is not indicated at this time. Patient is aware she will require further work-up outpatient for potential malignancy, including but not limited to Tumor markers, possible MRI, and consultation with a GYN oncologist. Plan for surgery to be established during work-up.    All questions answered. Patient to see Dr. Huffman in the office on Monday evening or Tuesday night.     Patient seen w/ Dr. Armida Tsai PGY-2    OB attending   patient seen and examined at bedside. agree with above assessment and plan

## 2019-07-22 ENCOUNTER — APPOINTMENT (OUTPATIENT)
Dept: OBGYN | Facility: CLINIC | Age: 59
End: 2019-07-22
Payer: COMMERCIAL

## 2019-07-22 VITALS
WEIGHT: 234 LBS | BODY MASS INDEX: 34.66 KG/M2 | DIASTOLIC BLOOD PRESSURE: 80 MMHG | HEART RATE: 73 BPM | SYSTOLIC BLOOD PRESSURE: 153 MMHG | HEIGHT: 69 IN

## 2019-07-22 DIAGNOSIS — Z01.411 ENCOUNTER FOR GYNECOLOGICAL EXAMINATION (GENERAL) (ROUTINE) WITH ABNORMAL FINDINGS: ICD-10-CM

## 2019-07-22 DIAGNOSIS — R19.00 INTRA-ABDOMINAL AND PELVIC SWELLING, MASS AND LUMP, UNSPECIFIED SITE: ICD-10-CM

## 2019-07-22 PROCEDURE — 99386 PREV VISIT NEW AGE 40-64: CPT

## 2019-07-22 RX ORDER — IPRATROPIUM BROMIDE 42 UG/1
0.06 SPRAY NASAL
Qty: 15 | Refills: 0 | Status: ACTIVE | COMMUNITY
Start: 2019-07-12

## 2019-07-22 RX ORDER — METFORMIN HYDROCHLORIDE 500 MG/1
500 TABLET, COATED ORAL
Qty: 60 | Refills: 0 | Status: ACTIVE | COMMUNITY
Start: 2019-01-16

## 2019-07-22 RX ORDER — NAPROXEN SODIUM 550 MG/1
550 TABLET ORAL
Qty: 60 | Refills: 0 | Status: COMPLETED | COMMUNITY
Start: 2019-04-29

## 2019-07-22 RX ORDER — AMOXICILLIN 875 MG/1
875 TABLET, FILM COATED ORAL
Qty: 20 | Refills: 0 | Status: COMPLETED | COMMUNITY
Start: 2019-07-12 | End: 2019-07-22

## 2019-07-22 RX ORDER — IBUPROFEN 800 MG/1
800 TABLET, FILM COATED ORAL
Qty: 40 | Refills: 0 | Status: COMPLETED | COMMUNITY
Start: 2018-11-06

## 2019-07-22 RX ORDER — HYDROCHLOROTHIAZIDE 25 MG/1
25 TABLET ORAL
Qty: 90 | Refills: 0 | Status: ACTIVE | COMMUNITY
Start: 2019-01-14

## 2019-07-22 RX ORDER — AMLODIPINE BESYLATE 10 MG/1
10 TABLET ORAL
Qty: 90 | Refills: 0 | Status: ACTIVE | COMMUNITY
Start: 2018-11-03

## 2019-07-22 RX ORDER — POTASSIUM CHLORIDE 1500 MG/1
20 TABLET, EXTENDED RELEASE ORAL
Qty: 180 | Refills: 0 | Status: ACTIVE | COMMUNITY
Start: 2019-04-29

## 2019-07-22 RX ORDER — OMEPRAZOLE 40 MG/1
40 CAPSULE, DELAYED RELEASE ORAL
Qty: 180 | Refills: 0 | Status: ACTIVE | COMMUNITY
Start: 2019-01-22

## 2019-07-22 RX ORDER — MELOXICAM 15 MG/1
15 TABLET ORAL
Qty: 30 | Refills: 0 | Status: ACTIVE | COMMUNITY
Start: 2018-11-06

## 2019-07-22 RX ORDER — METHIMAZOLE 10 MG/1
10 TABLET ORAL
Qty: 30 | Refills: 0 | Status: ACTIVE | COMMUNITY
Start: 2019-04-19

## 2019-07-22 NOTE — HISTORY OF PRESENT ILLNESS
[___ Year(s) Ago] : [unfilled] year(s) ago [Unsure Pap Smear] : uncertain timing of her last Papanicolaou cytology [Last Mammogram ___] : last mammogram done [unfilled] [No Previous CRC Screening] : no previous screening [Lower-Rt-Q] : lower right quadrant [Continuous] : continuous [___ Months] : started [unfilled] months ago [Fever] : no fever [Nausea] : no nausea [Vomiting] : no vomiting [Diarrhea] : no diarrhea [Vaginal Bleeding] : no vaginal bleeding [Pelvic Pressure] : no pelvic pressure [Dysuria] : no dysuria [Currently In Menopause] : currently in menopause [Post-Menopause, No Sxs] : post-menopausal, currently without symptoms [Menopause Age: ____] : age at menopause was [unfilled] [Sexually Active] : is not sexually active

## 2019-07-25 LAB
CYTOLOGY CVX/VAG DOC THIN PREP: NORMAL
HPV HIGH+LOW RISK DNA PNL CVX: NOT DETECTED

## 2019-07-27 LAB
CANCER AG125 SERPL-ACNC: 7 U/ML
CANCER AG19-9 SERPL-ACNC: 11 U/ML
CEA SERPL-MCNC: 1.5 NG/ML

## 2019-08-01 ENCOUNTER — APPOINTMENT (OUTPATIENT)
Dept: GYNECOLOGIC ONCOLOGY | Facility: CLINIC | Age: 59
End: 2019-08-01
Payer: COMMERCIAL

## 2019-08-01 VITALS
HEART RATE: 78 BPM | SYSTOLIC BLOOD PRESSURE: 153 MMHG | WEIGHT: 229 LBS | HEIGHT: 69 IN | BODY MASS INDEX: 33.92 KG/M2 | DIASTOLIC BLOOD PRESSURE: 90 MMHG

## 2019-08-01 PROCEDURE — 99244 OFF/OP CNSLTJ NEW/EST MOD 40: CPT

## 2019-08-01 NOTE — PHYSICAL EXAM
[Abnormal] : Bimanual Exam: Abnormal [Normal] : Recto-Vaginal Exam: Normal [de-identified] : mobile 8cm adnexal mass

## 2019-08-01 NOTE — DISCUSSION/SUMMARY
[Reviewed Clinical Lab Test(s)] : Results of clinical tests were reviewed. [Pre Op] : The differential diagnosis was discussed in detail. The indications, risks, benefits and alternatives were discussed. [unfilled] expressed an understanding of the treatment rationale and her questions were answered to her apparent satisfaction. [Reviewed Radiology Report(s)] : Radiology reports were reviewed. [FreeTextEntry1] : 60 yo with complex adnexal mass and normal tumor markers\par \par Agree with plan for L/S BSO with possible hysterectomy and staging.  Discussed limitations of frozen section and possibility of false negative result which may warrant return to OR.  Will coordinate surgical scheduling with Dr. Huffman's office.\par

## 2019-08-01 NOTE — HISTORY OF PRESENT ILLNESS
[FreeTextEntry1] : Referred by Dr. Huffman\par \par 60 yo postmenopausal x 27 years, who recently saw Dr. Huffman for initial gyn visit in 7/2019 after being evaluated at [facility] ER for abdominal pain.  Workup in ER revealed a 9cm adnexal mass.  CA-125, CEA, and  are normal.  She is scheduled for a pelvic MRI next week.  She presents today for consultation.  Plan for L/S BSO with Dr. Huffman with frozen section.\par \par Denies dyspnea or chest pain, vaginal bleeding or discharge, nausea/vomiting, changes in bowel habits or urination, lower extremity edema or pain.\par \par \par

## 2019-08-04 ENCOUNTER — FORM ENCOUNTER (OUTPATIENT)
Age: 59
End: 2019-08-04

## 2019-08-05 ENCOUNTER — APPOINTMENT (OUTPATIENT)
Dept: MRI IMAGING | Facility: CLINIC | Age: 59
End: 2019-08-05
Payer: COMMERCIAL

## 2019-08-05 ENCOUNTER — OUTPATIENT (OUTPATIENT)
Dept: OUTPATIENT SERVICES | Facility: HOSPITAL | Age: 59
LOS: 1 days | End: 2019-08-05
Payer: COMMERCIAL

## 2019-08-05 DIAGNOSIS — Z00.8 ENCOUNTER FOR OTHER GENERAL EXAMINATION: ICD-10-CM

## 2019-08-05 PROCEDURE — A9585: CPT

## 2019-08-05 PROCEDURE — 72197 MRI PELVIS W/O & W/DYE: CPT | Mod: 26

## 2019-08-05 PROCEDURE — 72197 MRI PELVIS W/O & W/DYE: CPT

## 2019-08-08 ENCOUNTER — OUTPATIENT (OUTPATIENT)
Dept: OUTPATIENT SERVICES | Facility: HOSPITAL | Age: 59
LOS: 1 days | End: 2019-08-08
Payer: COMMERCIAL

## 2019-08-08 VITALS
WEIGHT: 227.96 LBS | DIASTOLIC BLOOD PRESSURE: 80 MMHG | TEMPERATURE: 99 F | HEIGHT: 68 IN | HEART RATE: 85 BPM | OXYGEN SATURATION: 98 % | SYSTOLIC BLOOD PRESSURE: 140 MMHG | RESPIRATION RATE: 18 BRPM

## 2019-08-08 DIAGNOSIS — K21.9 GASTRO-ESOPHAGEAL REFLUX DISEASE WITHOUT ESOPHAGITIS: ICD-10-CM

## 2019-08-08 DIAGNOSIS — N94.89 OTHER SPECIFIED CONDITIONS ASSOCIATED WITH FEMALE GENITAL ORGANS AND MENSTRUAL CYCLE: ICD-10-CM

## 2019-08-08 DIAGNOSIS — E03.9 HYPOTHYROIDISM, UNSPECIFIED: ICD-10-CM

## 2019-08-08 DIAGNOSIS — E11.9 TYPE 2 DIABETES MELLITUS WITHOUT COMPLICATIONS: ICD-10-CM

## 2019-08-08 DIAGNOSIS — E87.6 HYPOKALEMIA: ICD-10-CM

## 2019-08-08 LAB — HBA1C BLD-MCNC: 6.2 % — HIGH (ref 4–5.6)

## 2019-08-08 PROCEDURE — 93010 ELECTROCARDIOGRAM REPORT: CPT

## 2019-08-08 RX ORDER — AMLODIPINE BESYLATE 2.5 MG/1
1 TABLET ORAL
Qty: 0 | Refills: 0 | DISCHARGE

## 2019-08-08 RX ORDER — PROPRANOLOL HCL 160 MG
0 CAPSULE, EXTENDED RELEASE 24HR ORAL
Qty: 0 | Refills: 0 | DISCHARGE

## 2019-08-08 RX ORDER — POTASSIUM CHLORIDE 20 MEQ
0 PACKET (EA) ORAL
Qty: 0 | Refills: 0 | DISCHARGE

## 2019-08-08 RX ORDER — SODIUM CHLORIDE 9 MG/ML
1000 INJECTION, SOLUTION INTRAVENOUS
Refills: 0 | Status: DISCONTINUED | OUTPATIENT
Start: 2019-08-16 | End: 2019-08-17

## 2019-08-08 RX ORDER — OMEPRAZOLE 10 MG/1
0 CAPSULE, DELAYED RELEASE ORAL
Qty: 0 | Refills: 0 | DISCHARGE

## 2019-08-08 RX ORDER — HYDROCHLOROTHIAZIDE 25 MG
0 TABLET ORAL
Qty: 0 | Refills: 0 | DISCHARGE

## 2019-08-08 NOTE — H&P PST ADULT - ASSESSMENT
60 yo female presents to PST unit with pre-op diagnosis of other specified conditions associated with female genital organs and menstrual cycle scheduled for laparoscopy, bilateral salpingo ophorectomy, possible exploratory, possible total abdominal hysterectomy on 08/16/2019.

## 2019-08-08 NOTE — H&P PST ADULT - RS GEN PE MLT RESP DETAILS PC
no wheezes/airway patent/good air movement/breath sounds equal/respirations non-labored/clear to auscultation bilaterally

## 2019-08-08 NOTE — H&P PST ADULT - NSICDXPROBLEM_GEN_ALL_CORE_FT
PROBLEM DIAGNOSES  Problem: Other specified conditions associated with female genital organs and menstrual cycle  Assessment and Plan:  Scheduled for laparoscopy, bilateral salpingo ophorectomy, possible exploratory, possible total abdominal hysterectomy on 08/16/2019.  Verbal and written pre-op instructions provided to patient. Patient verbalized understanding.   patient given verbal and written instruction with teach back on chlorhexidine shampoo, and the patient verbalized understanding with return demonstration.     Problem: Hypokalemia  Assessment and Plan: Pt. instructed to continue medications as prescribed.     Problem: Hypothyroid  Assessment and Plan: Pt. instructed to take Synthroid DOS with a small sip of water.     Problem: Acid reflux  Assessment and Plan: Pt. instructed to continue medications as prescribed.     Problem: Type 2 diabetes mellitus  Assessment and Plan: Pt. instructed to hold Metformin the night before and morning of procedure. Accucheck DOS. OR booking notified of DM2

## 2019-08-08 NOTE — H&P PST ADULT - NSANTHOSAYNRD_GEN_A_CORE
No. LUIS M screening performed.  STOP BANG Legend: 0-2 = LOW Risk; 3-4 = INTERMEDIATE Risk; 5-8 = HIGH Risk

## 2019-08-08 NOTE — H&P PST ADULT - HISTORY OF PRESENT ILLNESS
58 yo female presents to PSt unit with pre-op diagnosis of other specid 58 yo female presents to PSt unit with pre-op diagnosis of other specified conditions associated with female genital organs and menstrual cycle scheduled for laparoscopy, bilateral salpingo ophorectomy, possible exploratory, possible total abdominal hysterectomy on 08/16/2019. She reports intermittent RLQ abdominal pain two weeks ago s/p workup with CT/abd & pelvis, MRI and abdominal US which revealed large ovarian cyst.

## 2019-08-08 NOTE — H&P PST ADULT - NSICDXPASTMEDICALHX_GEN_ALL_CORE_FT
PAST MEDICAL HISTORY:  Acid reflux     HTN (Hypertension)     Hypothyroid     Other specified conditions associated with female genital organs and menstrual cycle     Type 2 diabetes mellitus

## 2019-08-09 LAB
BLD GP AB SCN SERPL QL: NEGATIVE — SIGNIFICANT CHANGE UP
RH IG SCN BLD-IMP: POSITIVE — SIGNIFICANT CHANGE UP

## 2019-08-15 ENCOUNTER — TRANSCRIPTION ENCOUNTER (OUTPATIENT)
Age: 59
End: 2019-08-15

## 2019-08-15 NOTE — ASU PATIENT PROFILE, ADULT - PMH
Acid reflux    HTN (Hypertension)    Hypothyroid    Other specified conditions associated with female genital organs and menstrual cycle    Type 2 diabetes mellitus

## 2019-08-16 ENCOUNTER — RESULT REVIEW (OUTPATIENT)
Age: 59
End: 2019-08-16

## 2019-08-16 ENCOUNTER — INPATIENT (INPATIENT)
Facility: HOSPITAL | Age: 59
LOS: 0 days | Discharge: HOME | End: 2019-08-17
Attending: STUDENT IN AN ORGANIZED HEALTH CARE EDUCATION/TRAINING PROGRAM | Admitting: STUDENT IN AN ORGANIZED HEALTH CARE EDUCATION/TRAINING PROGRAM
Payer: COMMERCIAL

## 2019-08-16 ENCOUNTER — APPOINTMENT (OUTPATIENT)
Dept: OBGYN | Facility: HOSPITAL | Age: 59
End: 2019-08-16

## 2019-08-16 VITALS
HEART RATE: 71 BPM | RESPIRATION RATE: 16 BRPM | WEIGHT: 227.96 LBS | TEMPERATURE: 98 F | HEIGHT: 68 IN | OXYGEN SATURATION: 98 % | DIASTOLIC BLOOD PRESSURE: 76 MMHG | SYSTOLIC BLOOD PRESSURE: 132 MMHG

## 2019-08-16 DIAGNOSIS — N94.89 OTHER SPECIFIED CONDITIONS ASSOCIATED WITH FEMALE GENITAL ORGANS AND MENSTRUAL CYCLE: ICD-10-CM

## 2019-08-16 LAB — RH IG SCN BLD-IMP: POSITIVE — SIGNIFICANT CHANGE UP

## 2019-08-16 PROCEDURE — 88112 CYTOPATH CELL ENHANCE TECH: CPT | Mod: 26

## 2019-08-16 PROCEDURE — 88331 PATH CONSLTJ SURG 1 BLK 1SPC: CPT | Mod: 26

## 2019-08-16 PROCEDURE — 88305 TISSUE EXAM BY PATHOLOGIST: CPT | Mod: 26,59

## 2019-08-16 PROCEDURE — 44970 LAPAROSCOPY APPENDECTOMY: CPT

## 2019-08-16 PROCEDURE — 88305 TISSUE EXAM BY PATHOLOGIST: CPT | Mod: 26

## 2019-08-16 PROCEDURE — 88302 TISSUE EXAM BY PATHOLOGIST: CPT | Mod: 26

## 2019-08-16 PROCEDURE — 88307 TISSUE EXAM BY PATHOLOGIST: CPT | Mod: 26

## 2019-08-16 RX ORDER — FENTANYL CITRATE 50 UG/ML
25 INJECTION INTRAVENOUS
Refills: 0 | Status: DISCONTINUED | OUTPATIENT
Start: 2019-08-16 | End: 2019-08-16

## 2019-08-16 RX ORDER — IBUPROFEN 200 MG
1 TABLET ORAL
Qty: 0 | Refills: 0 | DISCHARGE

## 2019-08-16 RX ORDER — METOCLOPRAMIDE HCL 10 MG
10 TABLET ORAL ONCE
Refills: 0 | Status: COMPLETED | OUTPATIENT
Start: 2019-08-16 | End: 2019-08-16

## 2019-08-16 RX ORDER — HEPARIN SODIUM 5000 [USP'U]/ML
5000 INJECTION INTRAVENOUS; SUBCUTANEOUS EVERY 12 HOURS
Refills: 0 | Status: DISCONTINUED | OUTPATIENT
Start: 2019-08-17 | End: 2019-08-17

## 2019-08-16 RX ORDER — POTASSIUM CHLORIDE 20 MEQ
2 PACKET (EA) ORAL
Qty: 0 | Refills: 0 | DISCHARGE

## 2019-08-16 RX ORDER — SODIUM CHLORIDE 9 MG/ML
1000 INJECTION, SOLUTION INTRAVENOUS
Refills: 0 | Status: DISCONTINUED | OUTPATIENT
Start: 2019-08-16 | End: 2019-08-17

## 2019-08-16 RX ORDER — DEXTROSE 50 % IN WATER 50 %
25 SYRINGE (ML) INTRAVENOUS ONCE
Refills: 0 | Status: DISCONTINUED | OUTPATIENT
Start: 2019-08-16 | End: 2019-08-17

## 2019-08-16 RX ORDER — DEXTROSE 50 % IN WATER 50 %
15 SYRINGE (ML) INTRAVENOUS ONCE
Refills: 0 | Status: DISCONTINUED | OUTPATIENT
Start: 2019-08-16 | End: 2019-08-17

## 2019-08-16 RX ORDER — DEXTROSE 50 % IN WATER 50 %
12.5 SYRINGE (ML) INTRAVENOUS ONCE
Refills: 0 | Status: DISCONTINUED | OUTPATIENT
Start: 2019-08-16 | End: 2019-08-17

## 2019-08-16 RX ORDER — METHIMAZOLE 10 MG/1
1 TABLET ORAL
Qty: 0 | Refills: 0 | DISCHARGE

## 2019-08-16 RX ORDER — METOPROLOL TARTRATE 50 MG
5 TABLET ORAL EVERY 6 HOURS
Refills: 0 | Status: DISCONTINUED | OUTPATIENT
Start: 2019-08-16 | End: 2019-08-17

## 2019-08-16 RX ORDER — METFORMIN HYDROCHLORIDE 850 MG/1
1 TABLET ORAL
Qty: 0 | Refills: 0 | DISCHARGE

## 2019-08-16 RX ORDER — FENTANYL CITRATE 50 UG/ML
50 INJECTION INTRAVENOUS
Refills: 0 | Status: DISCONTINUED | OUTPATIENT
Start: 2019-08-16 | End: 2019-08-16

## 2019-08-16 RX ORDER — ONDANSETRON 8 MG/1
4 TABLET, FILM COATED ORAL ONCE
Refills: 0 | Status: COMPLETED | OUTPATIENT
Start: 2019-08-16 | End: 2019-08-16

## 2019-08-16 RX ORDER — KETOROLAC TROMETHAMINE 30 MG/ML
30 SYRINGE (ML) INJECTION EVERY 6 HOURS
Refills: 0 | Status: DISCONTINUED | OUTPATIENT
Start: 2019-08-16 | End: 2019-08-17

## 2019-08-16 RX ORDER — GLUCAGON INJECTION, SOLUTION 0.5 MG/.1ML
1 INJECTION, SOLUTION SUBCUTANEOUS ONCE
Refills: 0 | Status: DISCONTINUED | OUTPATIENT
Start: 2019-08-16 | End: 2019-08-17

## 2019-08-16 RX ORDER — ACETAMINOPHEN 500 MG
975 TABLET ORAL EVERY 6 HOURS
Refills: 0 | Status: DISCONTINUED | OUTPATIENT
Start: 2019-08-16 | End: 2019-08-17

## 2019-08-16 RX ORDER — OMEPRAZOLE 10 MG/1
1 CAPSULE, DELAYED RELEASE ORAL
Qty: 0 | Refills: 0 | DISCHARGE

## 2019-08-16 RX ORDER — INSULIN LISPRO 100/ML
VIAL (ML) SUBCUTANEOUS
Refills: 0 | Status: DISCONTINUED | OUTPATIENT
Start: 2019-08-16 | End: 2019-08-17

## 2019-08-16 RX ORDER — AMLODIPINE BESYLATE 2.5 MG/1
1 TABLET ORAL
Qty: 0 | Refills: 0 | DISCHARGE

## 2019-08-16 RX ADMIN — ONDANSETRON 4 MILLIGRAM(S): 8 TABLET, FILM COATED ORAL at 20:30

## 2019-08-16 RX ADMIN — FENTANYL CITRATE 50 MICROGRAM(S): 50 INJECTION INTRAVENOUS at 22:29

## 2019-08-16 RX ADMIN — Medication 30 MILLIGRAM(S): at 22:15

## 2019-08-16 RX ADMIN — Medication 30 MILLIGRAM(S): at 22:40

## 2019-08-16 RX ADMIN — FENTANYL CITRATE 50 MICROGRAM(S): 50 INJECTION INTRAVENOUS at 21:45

## 2019-08-16 RX ADMIN — SODIUM CHLORIDE 125 MILLILITER(S): 9 INJECTION, SOLUTION INTRAVENOUS at 20:30

## 2019-08-16 RX ADMIN — Medication 10 MILLIGRAM(S): at 22:50

## 2019-08-16 NOTE — CHART NOTE - NSCHARTNOTEFT_GEN_A_CORE
R2 OBGYN POST-OP CHECK    S: Patient seen and evaluated at bedside.  Pt awake and alert resting in bed complaining of nausea.  Patient reports pain. Voided 500cc on the bedpan. Not OOB yet. Has not tried to tolerate clears. Pt denies V, SOB, CP, palpitations, fever/chills.     O:   T(C): 36.8 (08-16-19 @ 20:10), Max: 36.8 (08-16-19 @ 20:10)  HR: 72 (08-16-19 @ 21:30) (55 - 78)  BP: 147/75 (08-16-19 @ 21:45) (129/61 - 159/58)  RR: 18 (08-16-19 @ 21:45) (11 - 18)  SpO2: 95% (08-16-19 @ 21:45) (95% - 100%)  Wt(kg): --  I&O's Summary    16 Aug 2019 07:01  -  16 Aug 2019 22:20  --------------------------------------------------------  IN: 250 mL / OUT: 500 mL / NET: -250 mL        Gen: Resting in bed feeling nauseous  CV: S1S2, RRR  Lungs: CTA B/L  Abd: soft, appropriately tender, occasional BS x 4 quadrants.    Port site Clean/dry/intact w/ bandages in place  Ext: SCD's in place and functional, non-tender b/l, no edema        A/P: 59y Female s/p LSC BSO and appendectomy    Neuro:s/p QL blocks Toradol and PO Tylenol for pain control  CV: Hemodynamically stable.  Monitor VS. CBC in AM.   Pulm: Saturating well on room air.  Encourage OOB and incentive spirometer use.   GI: Advance to regular diet. Anti-emetics PRN.  : Voided adequately  FEN: Electrolytes: LR@125cc/hr until tolerating clears  Heme: DVT ppx w/ SCD's while in bed. Early ambulation, initially with assistance then as tolerated.  Continue HSQ   ID: Afebrile  Endo: ISS   Dispo: Discharge from PACU when criteria met.     Adela Blanchard PGY-2  d/w GYN team

## 2019-08-16 NOTE — BRIEF OPERATIVE NOTE - OPERATION/FINDINGS
appendix tip adhered to adnexal mass so appendectomy was performed to aid in mass resection
Abdominal survey revealed appendix adhered to ~12cm right ovarian mass. General surgery called in for appendectomy. Grossly normal appearing uterus with ~2cm myoma, left fallopian tube and left ovary.

## 2019-08-16 NOTE — ASU DISCHARGE PLAN (ADULT/PEDIATRIC) - CARE PROVIDER_API CALL
Gerber Huffman)  OBSGYN  General  [facility], [address]th [place] [address]  Phone: [phone]  Fax: [phone]  Follow Up Time:

## 2019-08-16 NOTE — BRIEF OPERATIVE NOTE - NSICDXBRIEFPROCEDURE_GEN_ALL_CORE_FT
PROCEDURES:  Laparoscopic appendectomy 16-Aug-2019 17:45:52  Shirlene Palm
PROCEDURES:  Laparoscopic bilateral salpingo-oophorectomy 16-Aug-2019 19:52:39  Shagufta Dowell

## 2019-08-16 NOTE — BRIEF OPERATIVE NOTE - NSICDXBRIEFPOSTOP_GEN_ALL_CORE_FT
POST-OP DIAGNOSIS:  Adnexal mass 16-Aug-2019 17:46:07  Shirlene Palm
POST-OP DIAGNOSIS:  Adnexal mass 16-Aug-2019 17:46:07  Shirlene Palm

## 2019-08-16 NOTE — BRIEF OPERATIVE NOTE - NSICDXBRIEFPREOP_GEN_ALL_CORE_FT
PRE-OP DIAGNOSIS:  Adnexal mass 16-Aug-2019 17:46:01  Shirlene Palm
PRE-OP DIAGNOSIS:  Adnexal mass 16-Aug-2019 17:46:01  Shirlene Palm

## 2019-08-16 NOTE — ASU DISCHARGE PLAN (ADULT/PEDIATRIC) - CALL YOUR DOCTOR IF YOU HAVE ANY OF THE FOLLOWING:
Nausea and vomiting that does not stop/Unable to urinate/Inability to tolerate liquids or foods/Fever greater than (need to indicate Fahrenheit or Celsius)/Bleeding that does not stop/Pain not relieved by Medications

## 2019-08-17 ENCOUNTER — TRANSCRIPTION ENCOUNTER (OUTPATIENT)
Age: 59
End: 2019-08-17

## 2019-08-17 VITALS
RESPIRATION RATE: 1 BRPM | OXYGEN SATURATION: 99 % | SYSTOLIC BLOOD PRESSURE: 119 MMHG | TEMPERATURE: 98 F | HEART RATE: 95 BPM | DIASTOLIC BLOOD PRESSURE: 59 MMHG

## 2019-08-17 DIAGNOSIS — Z98.890 OTHER SPECIFIED POSTPROCEDURAL STATES: ICD-10-CM

## 2019-08-17 LAB
ANION GAP SERPL CALC-SCNC: 17 MMO/L — HIGH (ref 7–14)
BUN SERPL-MCNC: 7 MG/DL — SIGNIFICANT CHANGE UP (ref 7–23)
CALCIUM SERPL-MCNC: 7.7 MG/DL — LOW (ref 8.4–10.5)
CHLORIDE SERPL-SCNC: 102 MMOL/L — SIGNIFICANT CHANGE UP (ref 98–107)
CO2 SERPL-SCNC: 16 MMOL/L — LOW (ref 22–31)
CREAT SERPL-MCNC: 0.38 MG/DL — LOW (ref 0.5–1.3)
GLUCOSE SERPL-MCNC: 183 MG/DL — HIGH (ref 70–99)
HCT VFR BLD CALC: 26.2 % — LOW (ref 34.5–45)
HCT VFR BLD CALC: 34 % — LOW (ref 34.5–45)
HGB BLD-MCNC: 11 G/DL — LOW (ref 11.5–15.5)
HGB BLD-MCNC: 8.3 G/DL — LOW (ref 11.5–15.5)
MAGNESIUM SERPL-MCNC: 1.1 MG/DL — LOW (ref 1.6–2.6)
MCHC RBC-ENTMCNC: 30.2 PG — SIGNIFICANT CHANGE UP (ref 27–34)
MCHC RBC-ENTMCNC: 30.4 PG — SIGNIFICANT CHANGE UP (ref 27–34)
MCHC RBC-ENTMCNC: 31.7 % — LOW (ref 32–36)
MCHC RBC-ENTMCNC: 32.4 % — SIGNIFICANT CHANGE UP (ref 32–36)
MCV RBC AUTO: 93.9 FL — SIGNIFICANT CHANGE UP (ref 80–100)
MCV RBC AUTO: 95.3 FL — SIGNIFICANT CHANGE UP (ref 80–100)
NRBC # FLD: 0 K/UL — SIGNIFICANT CHANGE UP (ref 0–0)
NRBC # FLD: 0 K/UL — SIGNIFICANT CHANGE UP (ref 0–0)
PHOSPHATE SERPL-MCNC: 2.3 MG/DL — LOW (ref 2.5–4.5)
PLATELET # BLD AUTO: 165 K/UL — SIGNIFICANT CHANGE UP (ref 150–400)
PLATELET # BLD AUTO: 191 K/UL — SIGNIFICANT CHANGE UP (ref 150–400)
PMV BLD: 10.5 FL — SIGNIFICANT CHANGE UP (ref 7–13)
PMV BLD: 9.9 FL — SIGNIFICANT CHANGE UP (ref 7–13)
POTASSIUM SERPL-MCNC: 4.1 MMOL/L — SIGNIFICANT CHANGE UP (ref 3.5–5.3)
POTASSIUM SERPL-SCNC: 4.1 MMOL/L — SIGNIFICANT CHANGE UP (ref 3.5–5.3)
RBC # BLD: 2.75 M/UL — LOW (ref 3.8–5.2)
RBC # BLD: 3.62 M/UL — LOW (ref 3.8–5.2)
RBC # FLD: 11.9 % — SIGNIFICANT CHANGE UP (ref 10.3–14.5)
RBC # FLD: 11.9 % — SIGNIFICANT CHANGE UP (ref 10.3–14.5)
SODIUM SERPL-SCNC: 135 MMOL/L — SIGNIFICANT CHANGE UP (ref 135–145)
WBC # BLD: 11.52 K/UL — HIGH (ref 3.8–10.5)
WBC # BLD: 9.18 K/UL — SIGNIFICANT CHANGE UP (ref 3.8–10.5)
WBC # FLD AUTO: 11.52 K/UL — HIGH (ref 3.8–10.5)
WBC # FLD AUTO: 9.18 K/UL — SIGNIFICANT CHANGE UP (ref 3.8–10.5)

## 2019-08-17 RX ORDER — BENZOCAINE AND MENTHOL 5; 1 G/100ML; G/100ML
1 LIQUID ORAL
Refills: 0 | Status: DISCONTINUED | OUTPATIENT
Start: 2019-08-17 | End: 2019-08-17

## 2019-08-17 RX ORDER — MAGNESIUM SULFATE 500 MG/ML
2 VIAL (ML) INJECTION ONCE
Refills: 0 | Status: COMPLETED | OUTPATIENT
Start: 2019-08-17 | End: 2019-08-17

## 2019-08-17 RX ORDER — ACETAMINOPHEN 500 MG
3 TABLET ORAL
Qty: 0 | Refills: 0 | DISCHARGE
Start: 2019-08-17

## 2019-08-17 RX ORDER — SODIUM CHLORIDE 9 MG/ML
3 INJECTION INTRAMUSCULAR; INTRAVENOUS; SUBCUTANEOUS EVERY 8 HOURS
Refills: 0 | Status: DISCONTINUED | OUTPATIENT
Start: 2019-08-17 | End: 2019-08-17

## 2019-08-17 RX ADMIN — Medication 50 GRAM(S): at 08:22

## 2019-08-17 RX ADMIN — Medication 2: at 08:22

## 2019-08-17 RX ADMIN — SODIUM CHLORIDE 3 MILLILITER(S): 9 INJECTION INTRAMUSCULAR; INTRAVENOUS; SUBCUTANEOUS at 06:49

## 2019-08-17 RX ADMIN — Medication 30 MILLIGRAM(S): at 05:16

## 2019-08-17 RX ADMIN — HEPARIN SODIUM 5000 UNIT(S): 5000 INJECTION INTRAVENOUS; SUBCUTANEOUS at 05:16

## 2019-08-17 RX ADMIN — Medication 975 MILLIGRAM(S): at 13:26

## 2019-08-17 RX ADMIN — Medication 975 MILLIGRAM(S): at 12:36

## 2019-08-17 RX ADMIN — SODIUM CHLORIDE 3 MILLILITER(S): 9 INJECTION INTRAMUSCULAR; INTRAVENOUS; SUBCUTANEOUS at 13:28

## 2019-08-17 RX ADMIN — Medication 30 MILLIGRAM(S): at 05:46

## 2019-08-17 NOTE — PROGRESS NOTE ADULT - ASSESSMENT
A/P: 59y POD#1 s/p LSC Bilateral Salpingo-oophorectomy, appendectomy.  Patient stayed in house overnight for pain control and monitoring due to prolonged case.  Patient is stable and doing well.

## 2019-08-17 NOTE — PROGRESS NOTE ADULT - ATTENDING COMMENTS
Patient seen and examined by me. Agree with resident assessment and plan. Continue postop care. Stable for discharge home once patient tolerates regular diet.

## 2019-08-17 NOTE — DISCHARGE NOTE NURSING/CASE MANAGEMENT/SOCIAL WORK - NSDCDPATPORTLINK_GEN_ALL_CORE
You can access the [facility] Patient Portal, offered by [facility], by registering with the following website: [email or web address]

## 2019-08-17 NOTE — PROGRESS NOTE ADULT - SUBJECTIVE AND OBJECTIVE BOX
ADOLFO YOUNG Progress Note    POD#1   HD#2    Patient seen and examined at bedside.  No acute events overnight. No acute complaints.  Pain well controlled.  Patient has not yet ambulated.  Tolerated jello this AM.  Has not yet passed flatus.  Voiding freely.   Denies CP, SOB, N/V, fevers, and chills.    Vital Signs Last 24 Hours  T(C): 37.2 (08-17-19 @ 05:13), Max: 37.2 (08-17-19 @ 05:13)  HR: 99 (08-17-19 @ 05:13) (55 - 99)  BP: 147/70 (08-17-19 @ 05:13) (129/61 - 164/69)  RR: 18 (08-17-19 @ 05:13) (11 - 25)  SpO2: 96% (08-17-19 @ 05:13) (93% - 100%)    I&O's Summary    16 Aug 2019 07:01  -  17 Aug 2019 07:00  --------------------------------------------------------  IN: 625 mL / OUT: 2110 mL / NET: -1485 mL        Physical Exam:  General: NAD  Abdomen: Soft, NTND, normoactive bowel sounds  Incision: 3 port site incisions CDI,  one umbilical extended port site c/d/i  : no bleeding on pad  Ext: No pain or swelling     Labs:                        8.3    9.18  )-----------( 165      ( 17 Aug 2019 06:08 )             26.2   baso x      eos x      imm gran x      lymph x      mono x      poly x          MEDICATIONS  (STANDING):  dextrose 5%. 1000 milliLiter(s) (50 mL/Hr) IV Continuous <Continuous>  dextrose 50% Injectable 12.5 Gram(s) IV Push once  dextrose 50% Injectable 25 Gram(s) IV Push once  dextrose 50% Injectable 25 Gram(s) IV Push once  heparin  Injectable 5000 Unit(s) SubCutaneous every 12 hours  insulin lispro (HumaLOG) corrective regimen sliding scale   SubCutaneous three times a day before meals  sodium chloride 0.9% lock flush 3 milliLiter(s) IV Push every 8 hours    MEDICATIONS  (PRN):  acetaminophen   Tablet .. 975 milliGRAM(s) Oral every 6 hours PRN Mild Pain (1 - 3)  dextrose 40% Gel 15 Gram(s) Oral once PRN Blood Glucose LESS THAN 70 milliGRAM(s)/deciliter  glucagon  Injectable 1 milliGRAM(s) IntraMuscular once PRN Glucose LESS THAN 70 milligrams/deciliter  ketorolac   Injectable 30 milliGRAM(s) IV Push every 6 hours PRN Moderate Pain (4 - 6)  metoprolol tartrate Injectable 5 milliGRAM(s) IV Push every 6 hours PRN BP over 155/100

## 2019-08-17 NOTE — PROGRESS NOTE ADULT - PROBLEM SELECTOR PLAN 1
Neuro: PO pain meds prn  CV: Hemodynamically stable.   H/H drop this AM .   More likely that pre-operative H/H hemoconcentrated and this is appropriate H/H drop for expected EBL.  Low concern for any intra-abdomianl bleeding given overall benign abdomen, vitals stable w/o hypotension or tachycardia and well mentating patient.   Will repeat CBC at 10 AM to ensure stability prior to discharge.   Pulm: Saturating well on room air, encourage oob/amb  GI: Continue regular diet  : Voiding freely  Heme: c/w HSQ and SCDs for DVT ppx  FEN: SLIV.  replete electrolytes prn   ID: Afebrile  Endo: -Type 2 DM:   continue moderate ISS and FS per routine.  FS currently in goal range   Dispo: Continue routine post-op care,.   Discharge planning for this afternoon.     Sharona Meek PGY-4  d/w Dr. Munroe

## 2019-08-17 NOTE — DISCHARGE NOTE PROVIDER - CARE PROVIDER_API CALL
Gerber Huffman)  OBSGYN  General  [facility] [address] [facility], [address]th [place] [address]  Phone: [phone]  Fax: [phone]  Follow Up Time:

## 2019-08-17 NOTE — DISCHARGE NOTE PROVIDER - HOSPITAL COURSE
Patient had uncomplicated lsc BSO and appendectomy with general surgery.  Please see operative note for details.  During postoperative course patient's vitals were stable, vaginal bleeding appropriate, and pain well controlled.  Post operation day one hematocrit was 26.2, repeat was 34.0.  On day of discharge patient was ambulating, her pain controlled with oral medications, had adequate oral intake, and was voiding freely.  Discharge instructions and precautions were given.  Will have close follow up with Dr. Huffman.

## 2019-08-20 PROBLEM — N94.89 OTHER SPECIFIED CONDITIONS ASSOCIATED WITH FEMALE GENITAL ORGANS AND MENSTRUAL CYCLE: Chronic | Status: ACTIVE | Noted: 2019-08-08

## 2019-08-20 PROBLEM — E11.9 TYPE 2 DIABETES MELLITUS WITHOUT COMPLICATIONS: Chronic | Status: ACTIVE | Noted: 2019-08-08

## 2019-08-30 ENCOUNTER — APPOINTMENT (OUTPATIENT)
Dept: OBGYN | Facility: CLINIC | Age: 59
End: 2019-08-30
Payer: COMMERCIAL

## 2019-08-30 VITALS
TEMPERATURE: 98.2 F | DIASTOLIC BLOOD PRESSURE: 86 MMHG | HEART RATE: 81 BPM | SYSTOLIC BLOOD PRESSURE: 146 MMHG | WEIGHT: 224 LBS | BODY MASS INDEX: 33.18 KG/M2 | HEIGHT: 69 IN

## 2019-08-30 PROCEDURE — 99024 POSTOP FOLLOW-UP VISIT: CPT

## 2019-08-30 NOTE — HISTORY OF PRESENT ILLNESS
[0/10] : no pain reported [Clean/Dry/Intact] : clean, dry and intact [Erythema] : not erythematous [Healed] : not healed [Discharge] : absent of discharge [None] : no vaginal bleeding [Normal] : the vagina was normal [Doing Well] : is doing well [No Sign of Infection] : is showing no signs of infection [de-identified] : pathology not back yet, will call with results. rto for annual

## 2019-10-09 ENCOUNTER — FORM ENCOUNTER (OUTPATIENT)
Age: 59
End: 2019-10-09

## 2019-10-09 DIAGNOSIS — R92.8 OTHER ABNORMAL AND INCONCLUSIVE FINDINGS ON DIAGNOSTIC IMAGING OF BREAST: ICD-10-CM

## 2019-10-10 ENCOUNTER — APPOINTMENT (OUTPATIENT)
Dept: ULTRASOUND IMAGING | Facility: IMAGING CENTER | Age: 59
End: 2019-10-10
Payer: COMMERCIAL

## 2019-10-10 ENCOUNTER — APPOINTMENT (OUTPATIENT)
Dept: MAMMOGRAPHY | Facility: IMAGING CENTER | Age: 59
End: 2019-10-10
Payer: COMMERCIAL

## 2019-10-10 ENCOUNTER — OUTPATIENT (OUTPATIENT)
Dept: OUTPATIENT SERVICES | Facility: HOSPITAL | Age: 59
LOS: 1 days | End: 2019-10-10
Payer: COMMERCIAL

## 2019-10-10 DIAGNOSIS — Z01.411 ENCOUNTER FOR GYNECOLOGICAL EXAMINATION (GENERAL) (ROUTINE) WITH ABNORMAL FINDINGS: ICD-10-CM

## 2019-10-10 PROCEDURE — 77063 BREAST TOMOSYNTHESIS BI: CPT

## 2019-10-10 PROCEDURE — 77067 SCR MAMMO BI INCL CAD: CPT | Mod: 26

## 2019-10-10 PROCEDURE — 77063 BREAST TOMOSYNTHESIS BI: CPT | Mod: 26

## 2019-10-10 PROCEDURE — 77067 SCR MAMMO BI INCL CAD: CPT

## 2019-10-11 PROBLEM — R92.8 ABNORMAL MAMMOGRAM: Status: ACTIVE | Noted: 2019-10-11

## 2019-10-23 ENCOUNTER — FORM ENCOUNTER (OUTPATIENT)
Age: 59
End: 2019-10-23

## 2019-10-24 ENCOUNTER — OUTPATIENT (OUTPATIENT)
Dept: OUTPATIENT SERVICES | Facility: HOSPITAL | Age: 59
LOS: 1 days | End: 2019-10-24
Payer: COMMERCIAL

## 2019-10-24 ENCOUNTER — APPOINTMENT (OUTPATIENT)
Dept: MAMMOGRAPHY | Facility: IMAGING CENTER | Age: 59
End: 2019-10-24
Payer: COMMERCIAL

## 2019-10-24 ENCOUNTER — APPOINTMENT (OUTPATIENT)
Dept: ULTRASOUND IMAGING | Facility: IMAGING CENTER | Age: 59
End: 2019-10-24
Payer: COMMERCIAL

## 2019-10-24 DIAGNOSIS — Z00.8 ENCOUNTER FOR OTHER GENERAL EXAMINATION: ICD-10-CM

## 2019-10-24 PROCEDURE — 76641 ULTRASOUND BREAST COMPLETE: CPT

## 2019-10-24 PROCEDURE — 76641 ULTRASOUND BREAST COMPLETE: CPT | Mod: 26,50

## 2019-10-24 PROCEDURE — 77061 BREAST TOMOSYNTHESIS UNI: CPT | Mod: 26

## 2019-10-24 PROCEDURE — G0279: CPT

## 2019-10-24 PROCEDURE — 77065 DX MAMMO INCL CAD UNI: CPT

## 2019-10-24 PROCEDURE — 77065 DX MAMMO INCL CAD UNI: CPT | Mod: 26,LT

## 2019-12-11 NOTE — ASU PATIENT PROFILE, ADULT - NS PRO TALK SOMEONE YN
unable to assess 55 yr old female with Hx significant for COPD 3L on home O2, ETOH abuse, breast Ca s/p mastectomy, remote brain aneurysm s/p clip,  rheumatoid and psoriatic arthritis on Otezla p/w sob also with signs of alcohol withdrawl

## 2020-01-01 NOTE — H&P PST ADULT - NS ABD PE RECTAL EXAM
Clavicles of normal shape, contour & nontender on palpation/Normal and symmetric appearance/Without redundant skin/Without webbing/Without masses/Without pits or sternocleidomastoid muscle lesions not examined

## 2020-01-24 ENCOUNTER — EMERGENCY (EMERGENCY)
Facility: HOSPITAL | Age: 60
LOS: 1 days | Discharge: HOME | End: 2020-01-24
Attending: STUDENT IN AN ORGANIZED HEALTH CARE EDUCATION/TRAINING PROGRAM
Payer: COMMERCIAL

## 2020-01-24 VITALS
OXYGEN SATURATION: 98 % | DIASTOLIC BLOOD PRESSURE: 66 MMHG | WEIGHT: 225.97 LBS | TEMPERATURE: 98 F | RESPIRATION RATE: 19 BRPM | HEIGHT: 68 IN | SYSTOLIC BLOOD PRESSURE: 162 MMHG | HEART RATE: 74 BPM

## 2020-01-24 VITALS
HEART RATE: 72 BPM | TEMPERATURE: 98 F | OXYGEN SATURATION: 97 % | DIASTOLIC BLOOD PRESSURE: 84 MMHG | RESPIRATION RATE: 17 BRPM | SYSTOLIC BLOOD PRESSURE: 137 MMHG

## 2020-01-24 PROCEDURE — 99283 EMERGENCY DEPT VISIT LOW MDM: CPT

## 2020-01-24 RX ORDER — ACETAMINOPHEN 500 MG
975 TABLET ORAL ONCE
Refills: 0 | Status: COMPLETED | OUTPATIENT
Start: 2020-01-24 | End: 2020-01-24

## 2020-01-24 NOTE — ED PROVIDER NOTE - ATTENDING CONTRIBUTION TO CARE
59 F w/ PMH of migraines p/w sharp shooting head pain that is on the R side of the head for 3 days.  No vision changes, no temporal pain. No fevers, no chills. no hx of trauma. no jaw claudication.   Pt states that she didn't take medications for the symptoms.   On exam, pt is awake and alert in no acute distress.   I have reviewed the triage vital signs. Const: Well-nourished, Well-developed, Eyes: no conjunctival injection and no scleral icterus ENMT: Moist mucus membranes, CVS: +S1/S2, radial/DP pulse 2+ bilaterally  RESP: Unlabored respiratory effort, Clear to auscultation bilaterally GI: Nontender/Nondistended soft abdomen, no CVA tenderness MSK: Extremities w/o deformity or ttp, Psych: Awake, Alert, & Orientedx3;  Appropriate mood and affect, cooperative  Neuro: 5/5 bilateral elbow flexion/extension, 5/5 bilateral wrist flexion/extension, 5/5 bilateral hand , 5/5 bilateral hip flexion/extension, 5/5 bilateral knee flexion/extension, 5/5 bilateral dorsiflexion/plantarflexion, intact sensation in the bilateral arms and legs to light touch, normal finger to nose bilaterally w/ no dysmetria, EOMI, CN2-12 intact, pupils are 4 mm and reactive bilaterally  plan for NSAIDS, suspect occipital neuralgia.

## 2020-01-24 NOTE — ED PROVIDER NOTE - NSFOLLOWUPCLINICS_GEN_ALL_ED_FT
[facility]  Neurology  [address] [place] [address] [address] [place] [address]  Phone: [phone]  Fax:   Follow Up Time: Routine

## 2020-01-24 NOTE — ED PROVIDER NOTE - PHYSICAL EXAMINATION
Gen: AAOx3, NAD  Head: NCAT, no reproducible head pain  ENT: Airway patent, moist mucous membranes, nasal passageways clear, no pharyngeal erythema or exudates, uvula midline, no cervical lymphadenopathy, eyes b/l PERRLA, EOMI, no nystagmus  Cardiac: Normal rate, normal rhythm, no murmurs/rubs/gallops appreciated  Respiratory: Lungs CTA B/L  Gastrointestinal:  Abdomen soft, nontender, nondistended, no rebound, no guarding   MSK: No gross abnormalities, FROM of all four extremities, no edema  HEME: Extremities warm, pulses intact and symmetrical in all four extremities  Skin: No rashes, no lesions  Neuro: No gross neurologic deficits, CN II-XII intact, no facial asymmetry, no dysarthria, no dysmetria, no drift, no dysdiadochokinesia, strength equal in all four extremities, normal heel shin,  no gait abnormality

## 2020-01-24 NOTE — ED PROVIDER NOTE - NS ED ROS FT
Gen: No fever, normal appetite  Eyes: No eye irritation or discharge  ENT: No ear pain, congestion, sore throat  Resp: No cough or trouble breathing  Cardiovascular: No chest pain or palpitation  Gastroenteric: No nausea/vomiting, diarrhea, constipation  :  No change in urine output; no dysuria  MS: No joint or muscle pain  Skin: No rashes  Neuro: + headache; no abnormal movements  Remainder negative, except as per the HPI

## 2020-01-24 NOTE — ED PROVIDER NOTE - NSFOLLOWUPINSTRUCTIONS_ED_ALL_ED_FT
A headache is pain or discomfort felt around the head or neck area. The specific cause of a headache may not be found as there are many types including tension headaches, migraine headaches, and cluster headaches. Watch your condition for any changes. Things you can do to manage your pain include taking over the counter and prescription medications as instructed by your health care provider, lying down in a dark quiet room, limiting stress, getting regular sleep, and refraining from alcohol and tobacco products.    SEEK IMMEDIATE MEDICAL CARE IF YOU HAVE ANY OF THE FOLLOWING SYMPTOMS: fever, vomiting, stiff neck, loss of vision, problems with speech, muscle weakness, loss of balance, trouble walking, passing out, or confusion.

## 2020-01-24 NOTE — ED ADULT NURSE NOTE - OBJECTIVE STATEMENT
This patient is a 59 year old female presenting to the emergency department with headache. Patient endorses sharp pain in right side of head 10/10 that causes her to "freeze", lasts for one minutes at a time, happens every 3-4 hours,  started on Wednesday, unrelieved by 800mg motrin. Currently endorses no pain. PMH HTN, hyperthyroidism, migraine. No neural or focal deficits noted. Denies dizziness, vision changes, chest pain, shortness of breath, abdominal pain, nausea, vomiting, diarrhea, fevers, chills, dysuria, hematuria, recent illness travel or fall.  Patient is A/Ox3, moves all extremities with equal strength. Lung sounds are clear bilaterally with unlabored breathing. Skin is warm and dry with strong peripheral pulses noted. Abdomen is non-tender/non-distended.

## 2020-01-24 NOTE — ED PROVIDER NOTE - CLINICAL SUMMARY MEDICAL DECISION MAKING FREE TEXT BOX
59 F w/ PMH migraines presents to the ED w/ sharp shooting pain that lasts for a few seconds and self resolves. Pt states that she is currently asymptomatic. Concern for occipital neuralgia, no neuro deficit.

## 2020-01-24 NOTE — ED PROVIDER NOTE - OBJECTIVE STATEMENT
59F hx HTN, hyperthyroidism on methimazole, DM, hx migraines p/w right parietal focal area of headache noted as a sharp pins and needles sensation that is so severe she "freezes" and cannot continue what she is doing, can last a few seconds to minutes sporadically, and does not completely resolved with motrin. At current does not have the sensation. No associated fevers, photosensitivity, visual changes, gait changes, numbness/weakness or radiation of pain down her neck. States this is not like her usual migraines.

## 2020-01-24 NOTE — ED ADULT NURSE REASSESSMENT NOTE - NS ED NURSE REASSESS COMMENT FT1
denies pain; declines acetaminophen at this time; pt is alert and oriented; skin warm and dry, no acute distress noted

## 2020-01-24 NOTE — ED PROVIDER NOTE - PATIENT PORTAL LINK FT
You can access the FollowMyHealth Patient Portal offered by [facility] by registering at the following website: [email or web address]. By joining [employer]’s FollowMyHealth portal, you will also be able to view your health information using other applications (apps) compatible with our system.

## 2020-01-24 NOTE — ED ADULT NURSE NOTE - NS ED NURSE DISCH DISPOSITION
Heart Rate: 55

RR Interval: 1091

P-R Interval: 196

QRSD Interval: 144

QT Interval: 428

QTC Interval: 410

P Axis: 66

QRS Axis: -15

T Wave Axis: -6

EKG Severity - ABNORMAL ECG -

EKG Impression: SINUS RHYTHM

EKG Impression: RIGHT BUNDLE BRANCH BLOCK

EKG Impression: INFERIOR INFARCT, AGE INDETERMINATE

Electronically Signed By: Yahir Sher 12-Jul-2018 21:37:06 Discharged

## 2020-07-19 NOTE — ED ADULT TRIAGE NOTE - BP NONINVASIVE SYSTOLIC (MM HG)
Patient prescribed zpack by outside provider, patient lost prescription and leaving for [place] in a few hours, prescription sent to replace  
127

## 2020-11-16 ENCOUNTER — APPOINTMENT (OUTPATIENT)
Dept: OBGYN | Facility: CLINIC | Age: 60
End: 2020-11-16

## 2020-12-04 ENCOUNTER — APPOINTMENT (OUTPATIENT)
Dept: OBGYN | Facility: CLINIC | Age: 60
End: 2020-12-04
Payer: COMMERCIAL

## 2020-12-04 ENCOUNTER — ASOB RESULT (OUTPATIENT)
Age: 60
End: 2020-12-04

## 2020-12-04 VITALS
SYSTOLIC BLOOD PRESSURE: 155 MMHG | DIASTOLIC BLOOD PRESSURE: 88 MMHG | HEIGHT: 69 IN | HEART RATE: 78 BPM | TEMPERATURE: 98.5 F | BODY MASS INDEX: 34.96 KG/M2 | WEIGHT: 236 LBS

## 2020-12-04 PROCEDURE — 99213 OFFICE O/P EST LOW 20 MIN: CPT

## 2020-12-04 PROCEDURE — 99072 ADDL SUPL MATRL&STAF TM PHE: CPT

## 2020-12-04 PROCEDURE — 76830 TRANSVAGINAL US NON-OB: CPT

## 2021-07-28 ENCOUNTER — RESULT REVIEW (OUTPATIENT)
Age: 61
End: 2021-07-28

## 2021-07-29 ENCOUNTER — RESULT REVIEW (OUTPATIENT)
Age: 61
End: 2021-07-29

## 2021-07-29 ENCOUNTER — OUTPATIENT (OUTPATIENT)
Dept: OUTPATIENT SERVICES | Facility: HOSPITAL | Age: 61
LOS: 1 days | End: 2021-07-29
Payer: COMMERCIAL

## 2021-07-29 ENCOUNTER — APPOINTMENT (OUTPATIENT)
Dept: ULTRASOUND IMAGING | Facility: IMAGING CENTER | Age: 61
End: 2021-07-29
Payer: COMMERCIAL

## 2021-07-29 ENCOUNTER — APPOINTMENT (OUTPATIENT)
Dept: MAMMOGRAPHY | Facility: IMAGING CENTER | Age: 61
End: 2021-07-29
Payer: COMMERCIAL

## 2021-07-29 DIAGNOSIS — Z00.8 ENCOUNTER FOR OTHER GENERAL EXAMINATION: ICD-10-CM

## 2021-07-29 PROCEDURE — 76641 ULTRASOUND BREAST COMPLETE: CPT

## 2021-07-29 PROCEDURE — G0279: CPT

## 2021-07-29 PROCEDURE — G0279: CPT | Mod: 26

## 2021-07-29 PROCEDURE — 77066 DX MAMMO INCL CAD BI: CPT

## 2021-07-29 PROCEDURE — 77066 DX MAMMO INCL CAD BI: CPT | Mod: 26

## 2021-07-29 PROCEDURE — 76641 ULTRASOUND BREAST COMPLETE: CPT | Mod: 26,50

## 2022-04-02 ENCOUNTER — EMERGENCY (EMERGENCY)
Facility: HOSPITAL | Age: 62
LOS: 1 days | Discharge: HOME | End: 2022-04-02
Attending: EMERGENCY MEDICINE
Payer: COMMERCIAL

## 2022-04-02 VITALS
TEMPERATURE: 98 F | HEIGHT: 68 IN | DIASTOLIC BLOOD PRESSURE: 95 MMHG | WEIGHT: 235.01 LBS | SYSTOLIC BLOOD PRESSURE: 161 MMHG | OXYGEN SATURATION: 98 % | RESPIRATION RATE: 20 BRPM | HEART RATE: 81 BPM

## 2022-04-02 VITALS
DIASTOLIC BLOOD PRESSURE: 87 MMHG | TEMPERATURE: 98 F | OXYGEN SATURATION: 98 % | RESPIRATION RATE: 18 BRPM | HEART RATE: 74 BPM | SYSTOLIC BLOOD PRESSURE: 154 MMHG

## 2022-04-02 LAB
ALBUMIN SERPL ELPH-MCNC: 4.2 G/DL — SIGNIFICANT CHANGE UP (ref 3.3–5)
ALP SERPL-CCNC: 86 U/L — SIGNIFICANT CHANGE UP (ref 40–120)
ALT FLD-CCNC: 12 U/L — SIGNIFICANT CHANGE UP (ref 10–45)
ANION GAP SERPL CALC-SCNC: 12 MMOL/L — SIGNIFICANT CHANGE UP (ref 5–17)
AST SERPL-CCNC: 20 U/L — SIGNIFICANT CHANGE UP (ref 10–40)
BASOPHILS # BLD AUTO: 0.01 K/UL — SIGNIFICANT CHANGE UP (ref 0–0.2)
BASOPHILS NFR BLD AUTO: 0.2 % — SIGNIFICANT CHANGE UP (ref 0–2)
BILIRUB SERPL-MCNC: 0.4 MG/DL — SIGNIFICANT CHANGE UP (ref 0.2–1.2)
BUN SERPL-MCNC: 14 MG/DL — SIGNIFICANT CHANGE UP (ref 7–23)
CALCIUM SERPL-MCNC: 9 MG/DL — SIGNIFICANT CHANGE UP (ref 8.4–10.5)
CHLORIDE SERPL-SCNC: 102 MMOL/L — SIGNIFICANT CHANGE UP (ref 96–108)
CO2 SERPL-SCNC: 26 MMOL/L — SIGNIFICANT CHANGE UP (ref 22–31)
CREAT SERPL-MCNC: 0.64 MG/DL — SIGNIFICANT CHANGE UP (ref 0.5–1.3)
EGFR: 100 ML/MIN/1.73M2 — SIGNIFICANT CHANGE UP
EOSINOPHIL # BLD AUTO: 0.08 K/UL — SIGNIFICANT CHANGE UP (ref 0–0.5)
EOSINOPHIL NFR BLD AUTO: 1.4 % — SIGNIFICANT CHANGE UP (ref 0–6)
FLUAV AG NPH QL: SIGNIFICANT CHANGE UP
FLUBV AG NPH QL: SIGNIFICANT CHANGE UP
GLUCOSE SERPL-MCNC: 119 MG/DL — HIGH (ref 70–99)
HCT VFR BLD CALC: 39.5 % — SIGNIFICANT CHANGE UP (ref 34.5–45)
HGB BLD-MCNC: 12.5 G/DL — SIGNIFICANT CHANGE UP (ref 11.5–15.5)
IMM GRANULOCYTES NFR BLD AUTO: 0.4 % — SIGNIFICANT CHANGE UP (ref 0–1.5)
LYMPHOCYTES # BLD AUTO: 1.12 K/UL — SIGNIFICANT CHANGE UP (ref 1–3.3)
LYMPHOCYTES # BLD AUTO: 19.6 % — SIGNIFICANT CHANGE UP (ref 13–44)
MAGNESIUM SERPL-MCNC: 2 MG/DL — SIGNIFICANT CHANGE UP (ref 1.6–2.6)
MCHC RBC-ENTMCNC: 30 PG — SIGNIFICANT CHANGE UP (ref 27–34)
MCHC RBC-ENTMCNC: 31.6 GM/DL — LOW (ref 32–36)
MCV RBC AUTO: 94.7 FL — SIGNIFICANT CHANGE UP (ref 80–100)
MONOCYTES # BLD AUTO: 0.37 K/UL — SIGNIFICANT CHANGE UP (ref 0–0.9)
MONOCYTES NFR BLD AUTO: 6.5 % — SIGNIFICANT CHANGE UP (ref 2–14)
NEUTROPHILS # BLD AUTO: 4.11 K/UL — SIGNIFICANT CHANGE UP (ref 1.8–7.4)
NEUTROPHILS NFR BLD AUTO: 71.9 % — SIGNIFICANT CHANGE UP (ref 43–77)
NRBC # BLD: 0 /100 WBCS — SIGNIFICANT CHANGE UP (ref 0–0)
PHOSPHATE SERPL-MCNC: 3.6 MG/DL — SIGNIFICANT CHANGE UP (ref 2.5–4.5)
PLATELET # BLD AUTO: 222 K/UL — SIGNIFICANT CHANGE UP (ref 150–400)
POTASSIUM SERPL-MCNC: 4.3 MMOL/L — SIGNIFICANT CHANGE UP (ref 3.5–5.3)
POTASSIUM SERPL-SCNC: 4.3 MMOL/L — SIGNIFICANT CHANGE UP (ref 3.5–5.3)
PROT SERPL-MCNC: 7.2 G/DL — SIGNIFICANT CHANGE UP (ref 6–8.3)
RBC # BLD: 4.17 M/UL — SIGNIFICANT CHANGE UP (ref 3.8–5.2)
RBC # FLD: 11.6 % — SIGNIFICANT CHANGE UP (ref 10.3–14.5)
RSV RNA NPH QL NAA+NON-PROBE: SIGNIFICANT CHANGE UP
SARS-COV-2 RNA SPEC QL NAA+PROBE: SIGNIFICANT CHANGE UP
SODIUM SERPL-SCNC: 140 MMOL/L — SIGNIFICANT CHANGE UP (ref 135–145)
WBC # BLD: 5.71 K/UL — SIGNIFICANT CHANGE UP (ref 3.8–10.5)
WBC # FLD AUTO: 5.71 K/UL — SIGNIFICANT CHANGE UP (ref 3.8–10.5)

## 2022-04-02 PROCEDURE — 99284 EMERGENCY DEPT VISIT MOD MDM: CPT | Mod: 25

## 2022-04-02 PROCEDURE — 36415 COLL VENOUS BLD VENIPUNCTURE: CPT

## 2022-04-02 PROCEDURE — 96375 TX/PRO/DX INJ NEW DRUG ADDON: CPT

## 2022-04-02 PROCEDURE — 87637 SARSCOV2&INF A&B&RSV AMP PRB: CPT

## 2022-04-02 PROCEDURE — 99284 EMERGENCY DEPT VISIT MOD MDM: CPT

## 2022-04-02 PROCEDURE — 80053 COMPREHEN METABOLIC PANEL: CPT

## 2022-04-02 PROCEDURE — 84100 ASSAY OF PHOSPHORUS: CPT

## 2022-04-02 PROCEDURE — 96374 THER/PROPH/DIAG INJ IV PUSH: CPT

## 2022-04-02 PROCEDURE — 83735 ASSAY OF MAGNESIUM: CPT

## 2022-04-02 PROCEDURE — 85025 COMPLETE CBC W/AUTO DIFF WBC: CPT

## 2022-04-02 RX ORDER — FAMOTIDINE 10 MG/ML
1 INJECTION INTRAVENOUS
Qty: 14 | Refills: 0
Start: 2022-04-02 | End: 2022-04-08

## 2022-04-02 RX ORDER — FAMOTIDINE 10 MG/ML
20 INJECTION INTRAVENOUS ONCE
Refills: 0 | Status: COMPLETED | OUTPATIENT
Start: 2022-04-02 | End: 2022-04-02

## 2022-04-02 RX ORDER — ONDANSETRON 8 MG/1
4 TABLET, FILM COATED ORAL ONCE
Refills: 0 | Status: COMPLETED | OUTPATIENT
Start: 2022-04-02 | End: 2022-04-02

## 2022-04-02 RX ORDER — ONDANSETRON 8 MG/1
1 TABLET, FILM COATED ORAL
Qty: 15 | Refills: 0
Start: 2022-04-02 | End: 2022-04-06

## 2022-04-02 RX ORDER — SODIUM CHLORIDE 9 MG/ML
1000 INJECTION INTRAMUSCULAR; INTRAVENOUS; SUBCUTANEOUS ONCE
Refills: 0 | Status: COMPLETED | OUTPATIENT
Start: 2022-04-02 | End: 2022-04-02

## 2022-04-02 RX ADMIN — SODIUM CHLORIDE 1000 MILLILITER(S): 9 INJECTION INTRAMUSCULAR; INTRAVENOUS; SUBCUTANEOUS at 18:10

## 2022-04-02 RX ADMIN — ONDANSETRON 4 MILLIGRAM(S): 8 TABLET, FILM COATED ORAL at 18:10

## 2022-04-02 RX ADMIN — FAMOTIDINE 20 MILLIGRAM(S): 10 INJECTION INTRAVENOUS at 18:10

## 2022-04-02 RX ADMIN — Medication 30 MILLILITER(S): at 18:10

## 2022-04-02 NOTE — ED PROVIDER NOTE - PROGRESS NOTE DETAILS
Manuel Cuellar MD: Patient reports that their symptoms have improved after medications. Repeat abdominal examination shows no significant tenderness, no peritoneal signs including rebound or guarding. Patient able to tolerate PO without nausea or vomiting. Stable for discharge with strict return precautions. Follow-up closely w/ PMD and GI.

## 2022-04-02 NOTE — ED PROVIDER NOTE - PATIENT PORTAL LINK FT
You can access the FollowMyHealth Patient Portal offered by [facility] by registering at the following website: [email or web address]. By joining [employer]’s FollowMyHealth portal, you will also be able to view your health information using other applications (apps) compatible with our system.

## 2022-04-02 NOTE — ED PROVIDER NOTE - PHYSICAL EXAMINATION
gen: well appearing  Mentation: AAO x 3  psych: mood appropriate  ENT: airway patent  Eyes: conjunctivae clear bilaterally  Cardio: RRR, no m/r/g  Resp: normal BS b/l  GI: s/nt/nd  : no CVA tenderness  Neuro: sensation and motor function intact  MSK: normal movement of all extremities  Lymph/Vasc: no LE edema

## 2022-04-02 NOTE — ED PROVIDER NOTE - CLINICAL SUMMARY MEDICAL DECISION MAKING FREE TEXT BOX
SACHI Hillman. PGY-3: 62 y/o F presenting with likely viral syndrome, exam without focal findings, abd nontender. Will check labs, COVID/flu swab. Symptomatic treatment, likely DC home

## 2022-04-02 NOTE — ED ADULT NURSE NOTE - OBJECTIVE STATEMENT
60 yo f arrived to the ed c/o 1 week of "not feeling well", decreased po intake, abd pain, bloating, +gas; +nausea; denies chest pain, SOB, HA, V/D, fever/chills, urinary symptoms, hematuria, weakness, dizziness, numbness, tinging. Peripheral pulses present b/l. Skin warm, dry and pink. Pt placed in position of comfort. Pt educated on call bell system and provided call bell. Bed in lowest position, wheels locked, appropriate side rails raised. Pt denies needs at this time.

## 2022-04-02 NOTE — ED PROVIDER NOTE - OBJECTIVE STATEMENT
62 y/o F with PMH of HTN, hyperthyroidism presenting with 1 week of abd cramping/bloating, nausea. Patient denies any v/d. 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 Was seen at urgent care few days ago and was DC with nausea meds.  Patient denies, fevers, chills, cp, sob, cough.  Despite triage note patient denies headache, states she feels like she is about to get a headache but does not actually have one 62 y/o F with PMH of HTN, DM, hyperthyroidism presenting with 1 week of abd cramping/bloating, nausea. Patient denies any v/d. 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 Was seen at urgent care few days ago and was DC with nausea meds.  Patient denies, fevers, chills, cp, sob, cough.  Despite triage note patient denies headache, states she feels like she is about to get a headache but does not actually have one

## 2022-04-02 NOTE — ED PROVIDER NOTE - ATTENDING CONTRIBUTION TO CARE
Manuel Cuellar MD:   I personally saw the patient and performed a substantive portion of the visit including all aspects of the medical decision making.    MDM: Patient with 1 week of periumbilical abdominal cramping and bloating with nausea but no vomiting, fever, chills, diarrhea, constipation or obstipation. Patient has sick contacts at work with people having similar symptoms.  Patient well appearing and has normal vitals. Benign abdominal examination without tenderness. Low utility for imaging in the ED. Shared decision making had with patient who agrees with plan of care.

## 2022-04-07 ENCOUNTER — OUTPATIENT (OUTPATIENT)
Dept: OUTPATIENT SERVICES | Facility: HOSPITAL | Age: 62
LOS: 1 days | End: 2022-04-07

## 2022-04-07 ENCOUNTER — APPOINTMENT (OUTPATIENT)
Age: 62
End: 2022-04-07
Payer: COMMERCIAL

## 2022-04-07 DIAGNOSIS — Z00.8 ENCOUNTER FOR OTHER GENERAL EXAMINATION: ICD-10-CM

## 2022-04-07 PROCEDURE — 76536 US EXAM OF HEAD AND NECK: CPT | Mod: 26

## 2022-08-16 ENCOUNTER — APPOINTMENT (OUTPATIENT)
Age: 62
End: 2022-08-16

## 2022-08-16 ENCOUNTER — OUTPATIENT (OUTPATIENT)
Dept: OUTPATIENT SERVICES | Facility: HOSPITAL | Age: 62
LOS: 1 days | End: 2022-08-16
Payer: COMMERCIAL

## 2022-08-16 DIAGNOSIS — Z00.8 ENCOUNTER FOR OTHER GENERAL EXAMINATION: ICD-10-CM

## 2022-08-16 PROCEDURE — 77067 SCR MAMMO BI INCL CAD: CPT | Mod: 26

## 2022-08-16 PROCEDURE — 77063 BREAST TOMOSYNTHESIS BI: CPT | Mod: 26

## 2022-08-16 PROCEDURE — 76641 ULTRASOUND BREAST COMPLETE: CPT

## 2022-08-16 PROCEDURE — 76641 ULTRASOUND BREAST COMPLETE: CPT | Mod: 26,50

## 2022-08-16 PROCEDURE — 77063 BREAST TOMOSYNTHESIS BI: CPT

## 2022-08-16 PROCEDURE — 77067 SCR MAMMO BI INCL CAD: CPT

## 2022-09-28 ENCOUNTER — EMERGENCY (EMERGENCY)
Facility: HOSPITAL | Age: 62
LOS: 1 days | Discharge: HOME | End: 2022-09-28
Attending: EMERGENCY MEDICINE
Payer: COMMERCIAL

## 2022-09-28 VITALS
RESPIRATION RATE: 18 BRPM | OXYGEN SATURATION: 100 % | DIASTOLIC BLOOD PRESSURE: 78 MMHG | SYSTOLIC BLOOD PRESSURE: 165 MMHG | HEART RATE: 78 BPM

## 2022-09-28 VITALS
DIASTOLIC BLOOD PRESSURE: 98 MMHG | RESPIRATION RATE: 16 BRPM | WEIGHT: 235.01 LBS | HEART RATE: 99 BPM | OXYGEN SATURATION: 99 % | SYSTOLIC BLOOD PRESSURE: 180 MMHG | TEMPERATURE: 99 F | HEIGHT: 68 IN

## 2022-09-28 PROCEDURE — 93970 EXTREMITY STUDY: CPT

## 2022-09-28 PROCEDURE — 96372 THER/PROPH/DIAG INJ SC/IM: CPT

## 2022-09-28 PROCEDURE — 99284 EMERGENCY DEPT VISIT MOD MDM: CPT | Mod: 25

## 2022-09-28 PROCEDURE — 82962 GLUCOSE BLOOD TEST: CPT

## 2022-09-28 PROCEDURE — 99285 EMERGENCY DEPT VISIT HI MDM: CPT

## 2022-09-28 PROCEDURE — 93970 EXTREMITY STUDY: CPT | Mod: 26

## 2022-09-28 PROCEDURE — 72110 X-RAY EXAM L-2 SPINE 4/>VWS: CPT | Mod: 26

## 2022-09-28 PROCEDURE — 72110 X-RAY EXAM L-2 SPINE 4/>VWS: CPT

## 2022-09-28 RX ORDER — METHOCARBAMOL 500 MG/1
1 TABLET, FILM COATED ORAL
Qty: 30 | Refills: 0
Start: 2022-09-28 | End: 2022-10-07

## 2022-09-28 RX ORDER — KETOROLAC TROMETHAMINE 30 MG/ML
30 SYRINGE (ML) INJECTION ONCE
Refills: 0 | Status: DISCONTINUED | OUTPATIENT
Start: 2022-09-28 | End: 2022-09-28

## 2022-09-28 RX ORDER — IBUPROFEN 200 MG
1 TABLET ORAL
Qty: 40 | Refills: 0
Start: 2022-09-28 | End: 2022-10-07

## 2022-09-28 RX ADMIN — Medication 30 MILLIGRAM(S): at 14:18

## 2022-09-28 RX ADMIN — Medication 30 MILLIGRAM(S): at 15:00

## 2022-09-28 NOTE — ED PROVIDER NOTE - NSFOLLOWUPCLINICS_GEN_ALL_ED_FT
[facility]  Sports Medicine  [address] [place] [address]  Phone: [phone]  Fax:

## 2022-09-28 NOTE — ED PROVIDER NOTE - NSFOLLOWUPINSTRUCTIONS_ED_ALL_ED_FT
It is very important that if your symptoms worsen, do not resolve or if you have any new symptoms, you must be seen by a physician right away. Please go to nearest Emergency Department.

## 2022-09-28 NOTE — ED ADULT NURSE NOTE - OBJECTIVE STATEMENT
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 pain worsening & radiating to left upper thigh & down her left ankle & feet. Last dose of motrin taken last night. Seen her neurologist weeks ago & was prescribed gabapentin & lyrica. Also added having numb feeling to joints of both hands.

## 2022-09-28 NOTE — ED PROVIDER NOTE - OBJECTIVE STATEMENT
60 y/o F with PMH of HTN, DM, hyperthyroidism presenting with left lower back pain with occasional radiation down the left leg for the past week. 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 Says the pain occasionally goes down her left lateral leg. Says Ibu has been helpful, and says she last took some this morning. Her PMD, Dr. Yosi Angela, referred her to a neurologist, who started her on Lyrica, which she did not do well with, and then was started on gabapentin, which she states has not worked for her. reports occasional tinging at her bl fingertips as well. Also, pt report sno current symptoms other than back pain. states the pain down the leg is at random and with inciting symptoms, though she states she feels it occasionally when lying down. Denies fever, chills, nausea, vomitting, diarrhea, dizziness, headache, numbness, tingling, and paresthesias, and saddle anesthesia and urinary and bowel incontinence.

## 2022-09-28 NOTE — ED PROVIDER NOTE - PROGRESS NOTE DETAILS
Pt feeling better with pain medication, lumbar imagin showing degenerative changes which may explain pt's symptoms, pt understood to fu with her neuroglist for further work up, will provide rx for methocarbamol and ibu, and will dc in stable condition. Pt feeling better with pain medication, lumbar imagin showing degenerative changes which may explain pt's symptoms, pt understood to fu with her neuroglist for further work up, will provide rx for methocarbamol and ibu, and will dc in stable condition.    Attending Statement: Agree with the above.  --SARA

## 2022-09-28 NOTE — ED PROVIDER NOTE - CLINICAL SUMMARY MEDICAL DECISION MAKING FREE TEXT BOX
toradol ordered and lumbar spine xray, pt may have mild radiculopathy is getting appropriate fu. Pt is largely asymptomatic, will refer to her providers if work up neg here....

## 2022-09-28 NOTE — ED PROVIDER NOTE - PATIENT PORTAL LINK FT
You can access the FollowMyHealth Patient Portal offered by [facility] by registering at the following website: [email or web address]. By joining [facility] FollowMyHealth portal, you will also be able to view your health information using other applications (apps) compatible with our system.

## 2022-09-28 NOTE — ED PROVIDER NOTE - NS ED ROS FT
CONST: no fevers, no chills  EYES: no redness, no vision changes   HENT: no throat pain, no epistaxis  CV: no chest pain, no palpitations     RESP: no shortness of breath, no cough  ABD: no abdominal pain, no vomiting     : no dysuria, no hematuria   MSK: no muscle pain, no joint swelling     NEURO: no headache, no focal weakness or loss of sensation     SKIN:  no rash, no lacerations.

## 2022-09-28 NOTE — ED PROVIDER NOTE - PHYSICAL EXAMINATION
Physical Exam:   GEN: high bmi female in no acute distress, AAOx3  HENT: NCAT, MMM, no stridor  EYES: PERRLA, EOMI  RESP: CTAB, no respiratory distress  CV: normal rate and regular rhythm, S1 S2  ABD: soft and NTND  Back: minimal paraspinal tenderness at lower left spine, straight leg test neg bl, seated straight leg test neg bl, nl ROM at bl hips and knees  EXT: No edema, No bony deformity of extremities  SKIN: No skin breaks, skin color normal for race  NEURO: CN grossly intact, No focal motor or sensory deficits. nl hang  and bl extremity strength bl

## 2022-09-28 NOTE — ED ADULT NURSE NOTE - SUICIDE SCREENING QUESTION 2
Last OV 06/15/2020    Next OV 10/12/2020    Health Maintenance   Topic Date Due    Shingles Vaccine (1 of 2) 04/17/1996    Diabetic retinal exam  06/04/2016    Diabetic foot exam  01/17/2019    Diabetic microalbuminuria test  10/29/2019    Lipid screen  10/29/2019    Flu vaccine (1) 09/01/2020    Annual Wellness Visit (AWV)  10/02/2020    TSH testing  05/20/2021    Potassium monitoring  05/20/2021    Creatinine monitoring  05/20/2021    A1C test (Diabetic or Prediabetic)  06/15/2021    Colon cancer screen colonoscopy  05/07/2025    DTaP/Tdap/Td vaccine (2 - Tdap) 08/20/2027    Pneumococcal 65+ years Vaccine  Completed    AAA screen  Completed    Hepatitis C screen  Completed    Hepatitis A vaccine  Aged Out    Hib vaccine  Aged Out    Meningococcal (ACWY) vaccine  Aged Out             (applicable per patient's age: Cancer Screenings, Depression Screening, Fall Risk Screening, Immunizations)    Hemoglobin A1C (%)   Date Value   06/15/2020 7.4   01/10/2020 7.4   10/02/2019 7.5     Microalb/Crt.  Ratio (mcg/mg creat)   Date Value   05/24/2017 26 (H)     LDL Cholesterol (mg/dL)   Date Value   05/31/2016 105     LDL Calculated (mg/dL)   Date Value   10/29/2018 162 (A)     AST (U/L)   Date Value   05/20/2020 19     ALT (U/L)   Date Value   05/20/2020 24     BUN (mg/dL)   Date Value   05/20/2020 24      (goal A1C is < 7)   (goal LDL is <100) need 30-50% reduction from baseline     BP Readings from Last 3 Encounters:   07/15/20 128/80   06/15/20 128/80   04/27/20 130/80    (goal /80)      All Future Testing planned in [facility]:  Lab Frequency Next Occurrence   ECHO Complete 2D W Doppler W Color Once 02/24/2020   EKG 12 Lead Once 01/15/2021   ECHO Complete 2D W Doppler W Color Once 01/15/2021       Next Visit Date:  Future Appointments   Date Time Provider Adeline Odonnell   10/12/2020  9:20 AM Thomas Head, APRN - CNP [id number]            Patient Active Problem List:     Misty Barton No

## 2022-09-28 NOTE — ED PROVIDER NOTE - ATTENDING CONTRIBUTION TO CARE
Manuel Cuellar MD:   I personally saw the patient and performed a substantive portion of the visit including all aspects of the medical decision making.    MDM: 62-year-old female with history of hypertension, diabetes, hyperthyroidism who presents with left-sided low back pain with radiation down to the left lower extremity more than the right lower extremity.  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  Denies fevers, chills, weight loss, pain worse at night, urinary retention, incontinence of bowel or bladder, saddle anesthesia, lower extremity weakness or numbness, and no recent trauma or falls.  Also reports swelling to her left more than right lower extremity.  Denies any chest pain, shortness of breath, dizziness, hemoptysis.  On examination patient has left-sided paraspinal tenderness.  Neurovascular intact in L2-S1.  Normal saddle region sensation.  Will obtain x-ray of L-spine.  No neurodeficits or red flag symptoms indicates further advanced imaging of L-spine at in the ED, but patient would benefit from this as outpatient and will have patient follow-up with spine.  Will perform duplex of bilateral lower extremity to rule out DVT, though low likelihood patient with no risk factors for this.

## 2022-09-28 NOTE — ED ADULT NURSE NOTE - NSIMPLEMENTINTERV_GEN_ALL_ED
Implemented All Universal Safety Interventions:  [place] to call system. Call bell, personal items and telephone within reach. Instruct patient to call for assistance. Room bathroom lighting operational. Non-slip footwear when patient is off stretcher. Physically safe environment: no spills, clutter or unnecessary equipment. Stretcher in lowest position, wheels locked, appropriate side rails in place.

## 2022-09-29 ENCOUNTER — APPOINTMENT (OUTPATIENT)
Dept: OBGYN | Facility: CLINIC | Age: 62
End: 2022-09-29

## 2022-09-30 ENCOUNTER — APPOINTMENT (OUTPATIENT)
Dept: OBGYN | Facility: CLINIC | Age: 62
End: 2022-09-30

## 2022-09-30 VITALS
HEART RATE: 95 BPM | BODY MASS INDEX: 35.4 KG/M2 | HEIGHT: 69 IN | DIASTOLIC BLOOD PRESSURE: 94 MMHG | SYSTOLIC BLOOD PRESSURE: 155 MMHG | WEIGHT: 239 LBS

## 2022-09-30 DIAGNOSIS — R32 UNSPECIFIED URINARY INCONTINENCE: ICD-10-CM

## 2022-09-30 PROCEDURE — 99396 PREV VISIT EST AGE 40-64: CPT

## 2022-10-02 LAB — HPV HIGH+LOW RISK DNA PNL CVX: NOT DETECTED

## 2022-10-06 LAB — CYTOLOGY CVX/VAG DOC THIN PREP: NORMAL

## 2022-11-03 ENCOUNTER — NON-APPOINTMENT (OUTPATIENT)
Age: 62
End: 2022-11-03

## 2023-01-04 ENCOUNTER — APPOINTMENT (OUTPATIENT)
Dept: ORTHOPEDIC SURGERY | Facility: CLINIC | Age: 63
End: 2023-01-04
Payer: COMMERCIAL

## 2023-01-04 VITALS
TEMPERATURE: 97.8 F | OXYGEN SATURATION: 96 % | BODY MASS INDEX: 34.8 KG/M2 | DIASTOLIC BLOOD PRESSURE: 92 MMHG | SYSTOLIC BLOOD PRESSURE: 169 MMHG | HEART RATE: 83 BPM | HEIGHT: 69 IN | WEIGHT: 235 LBS

## 2023-01-04 DIAGNOSIS — M25.562 PAIN IN RIGHT KNEE: ICD-10-CM

## 2023-01-04 DIAGNOSIS — M25.561 PAIN IN RIGHT KNEE: ICD-10-CM

## 2023-01-04 PROCEDURE — 99204 OFFICE O/P NEW MOD 45 MIN: CPT

## 2023-01-04 PROCEDURE — 73564 X-RAY EXAM KNEE 4 OR MORE: CPT | Mod: RT

## 2023-01-04 RX ORDER — HYALURONATE SODIUM, STABILIZED 88 MG/4 ML
88 SYRINGE (ML) INTRAARTICULAR
Qty: 2 | Refills: 0 | Status: ACTIVE | COMMUNITY
Start: 2023-01-04

## 2023-01-04 NOTE — PHYSICAL EXAM
[de-identified] : Constitutional - the patient is of normal build and overweight with a BMI of 34.7..  The patient remains oriented to person, time, and  place.  Mood is normal. Vital signs as recorded.  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  The patients gait is pain in both of her knees. The patient has satisfactory  balance and can stand on toes and heels.\par \par The patient has no difficulty with respiration. Respiration at rest is a normal rate. The patient is not short of breath and has not become short of breath with short ambulation. There is no audible wheezing. No coughing.\par \par Skin is normal for the patient's age. There are no abnormal masses or lymph nodes which stand out in the lower extremities.\par \par Spine - deep tendon reflexes are symmetric. Motor and sensory are symmetric.\par \par \par UPPER EXTREMITIES \par \par Shoulders ROM  is symmetric  and the motion is satisfactory.  There is no significant shoulder pain or limitation in motion which would make using a cane or a walker difficult. Shoulder stability and  strength are satisfactory.\par \par There is normal motion in the wrists and elbows.\par \par Circulation appears satisfactory with pedal pulses present.  There is no major edema in the lower legs. No skin tenderness or increased temperature. No major varicosities.\par \par HIP EXAMINATION the abduction and abduction as well as rotation measurements were taken with the hip in flexion.\par \par Motion\par There is symmetric motion with flexion 135 degrees, abduction 80 degrees, adduction 30 degrees, external rotation 80 degrees, internal rotation 20 degrees.\par \par The hips have good range of motion. There is good strength across the hips. There is no crepitus in either hip. The alignment of the hips is normal.\par \par \par KNEE EXAMINATION\par \par Motion\par Right Knee has 0 to 120 degrees of motion.  She has good medial lateral and anterior posterior stability.  She does not have a major effusion and no Baker's cyst however she does have marked patellofemoral crepitus pain with compression of her patella and any attempt to subluxate the patella.  She also has discomfort over medial joint line. .  The patient has satisfactory strength across the knee.       \par         \par Right Knee has a very similar examination to her left.  She has 0 to 120 degrees of motion.  She has good medial lateral and anterior posterior stability.  She does not have a major effusion and no Baker's cyst however she does have marked patellofemoral crepitus pain with compression of her patella and any attempt to subluxate the patella.  She also has discomfort over medial joint line. .  The patient has satisfactory strength across the knee.   \par \par \par Ankle and foot examination\par Of the ankle has normal motion.  There is normal ankle stability.  The patient has no major abnormalities of the foot.\par \par \par \par  [de-identified] : 4 views done both of the right and left knees show medial compartment narrowing on the AP view view the Xavier view does show more marked medial narrowing but still some cartilage space present but the marked narrowing is seen also on the lateral view of the right and left knee there is a significant peripheral osteophyte off the lateral anterior trochlea of the femur at the skyline view.  I feel she has bilateral patellofemoral degenerative arthritis as well as probably medial compartment arthritis possibly bone against bone.

## 2023-01-04 NOTE — DISCUSSION/SUMMARY
[de-identified] : This patient has bilateral knee arthritis.  She will try to bring her weight down.  She will increase the dose of ibuprofen she is taking up to 600 mg 3 times a day as needed may supplement with Tylenol.  She has had several cortisone injections in the past and they do not appear to be working well.  She will try to bring her weight down.  We will order a hyaluronic acid injection for each of her knees and when we will give the 1 dose of variety in this will be done after we get clearance and for it.  She will come in for the injection at that time.  She is in agreement with the plan.

## 2023-01-04 NOTE — HISTORY OF PRESENT ILLNESS
[de-identified] : Ms. ALIVIA MAYER is a 62 year female who presents to office complaining of bilateral knee pain x 1-2 years with insidious onset.\par Denies specific injury, trauma, or fall to her knees.\par Pain is described as a constant dull/achy pain localized to the medial aspects of the joints, progressively worsening in nature.\par Pain is aggravated with activities that involve getting up from a seated position and with prolonged ambulation, especially up and down inclines.\par Pain is improved with rest. She has seen another orthopedist who has given her cortisone injections, last done about 1 year ago, which did not help.\par Denies knee instability, laxity, giving way, numbness, or tingling.\par All review of systems, family history, social history, surgical history, past medical history, medications, and allergies not previously stated as positive are negative. They were reviewed by me today with the patient and documented accordingly.

## 2023-01-10 RX ORDER — HYALURONATE SOD, CROSS-LINKED 30 MG/3 ML
30 SYRINGE (ML) INTRAARTICULAR
Qty: 2 | Refills: 0 | Status: ACTIVE | COMMUNITY
Start: 2023-01-10

## 2023-05-09 ENCOUNTER — APPOINTMENT (OUTPATIENT)
Dept: ORTHOPEDIC SURGERY | Facility: CLINIC | Age: 63
End: 2023-05-09
Payer: COMMERCIAL

## 2023-05-09 VITALS
DIASTOLIC BLOOD PRESSURE: 81 MMHG | HEART RATE: 91 BPM | HEIGHT: 68 IN | BODY MASS INDEX: 34.86 KG/M2 | SYSTOLIC BLOOD PRESSURE: 160 MMHG | OXYGEN SATURATION: 97 % | WEIGHT: 230 LBS

## 2023-05-09 PROCEDURE — 20610 DRAIN/INJ JOINT/BURSA W/O US: CPT | Mod: RT

## 2023-05-09 NOTE — HISTORY OF PRESENT ILLNESS
Clinical Summary

                             Created on: 2019



Jovanny Shaw

External Reference #: [id number]

: 1949

Sex: Male



Demographics







                          Address                   [address]  [address]

 

                          Home Phone                [phone]

 

                          Preferred Language        English

 

                          Marital Status            Unknown

 

                          Catholic Affiliation     BAP

 

                          Race                      White

 

                          Ethnic Group              Not  or 





Author







                          Author                    [facility]

 

                          Organization              [facility]

 

                          Address                   Unknown

 

                          Phone                     Unavailable







Support







                Name            Relationship    Address         Phone

 

                Laura Shaw    [employer]            Unknown         [phone]

 

                Leatha Monk      [employer]            Unknown         [phone]







Care Team Providers







                    Care Team Member Name    Role                Phone

 

                    Saturnino Ash MD    Unavailable         [phone]







Source Comments

Some departments are not documenting in the electronic medical record.  If you d
o not see the information that you expected, contact Release of Information in [facility] Health Information Management department at [phone] for further assistan
ce in locating additional records.[facility]



Allergies







                                        Comments



                 Active Allergy     Reactions       Severity        Noted Date 

 

                                         



                     Penicillins         UNKNOWN             2014 







Medications







                          End Date                  Status



              Medication     Sig          Dispensed     Refills      Start  



                                         Date  

 

                                                    Active



                     metFORMIN (GLUCOPHAGE)     Take 1,000 mg       0   



                           1,000 mg tablet           by mouth     



                                         twice daily     



                                         with meals.     

 

                                                    Active



                     sitaGLIPtin (JANUVIA) 100     Take 100 mg         0   



                           mg tab tablet             by mouth     



                                         daily.     

 

                                                    Active



                     hydrochlorothiazide     Take 25 mg by       0   



                           (HYDRODIURIL) 25 mg       mouth daily.     



                                         tablet      

 

                                                    Active



                     glimepiride (AMARYL) 4 mg     Take 4 mg by        0   



                           tablet                    mouth twice     



                                         daily.     

 

                                                    Active



                     ramipril (ALTACE) 10 mg     Take 10 mg by       0   



                           capsule                   mouth twice     



                                         daily.     

 

                                                    Active



                     atorvastatin (LIPITOR) 10     Take 10 mg by       0   



                           mg tablet                 mouth daily.     

 

                                                    Active



                     aspirin 325 mg tablet     Take 650 mg         0   



                                         by mouth     



                                         daily.     

 

                                                    Active



                     NITROGLYCERIN (NITRO-BID     Apply  to top       0   



                           TD)                       of skin as     



                                         directed.     



                                         Remove at     



                                         bedtime     

 

                                                    Active



                     NAPROXEN SODIUM (ALL DAY     Take 1 Tab by       0   



                           PAIN RELIEF PO)           mouth twice     



                                         daily.     







Active Problems







 



                           Problem                   Noted Date

 

 



                           PAD (peripheral artery disease)     2014

 

 



                           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







Social History







                                        Date



                 Tobacco Use     Types           Packs/Day       Years Used 

 

                                         



                                         Current Every Day Smoker    









                    Drinks/Week         oz/Week             Comments



                                         Alcohol Use   

 

                                                             



                                         Not Asked   









 



                           Sex Assigned at Birth     Date Recorded

 

 



                                         Not on file 









                                        Industry



                           Job Start Date            Occupation 

 

                                        Not on file



                           Not on file               Not on file 









                                        Travel End



                           Travel History            Travel Start 













                                         No recent travel history available.







Last Filed Vital Signs







                    Reading             Time Taken          Comments



                                         Vital Sign   

 

                    131/59              2014  2:05 PM CDT     



                                         Blood Pressure   

 

                    79                  2014  2:05 PM CDT     



                                         Pulse   

 

                    37.1 C (98.8 F)    2014  2:05 PM CDT     



                                         Temperature   

 

                    14                  2014  2:05 PM CDT     



                                         Respiratory Rate   

 

                    -                   -                    



                                         Oxygen Saturation   

 

                    -                   -                    



                                         Inhaled Oxygen   



                                         Concentration   

 

                    102.2 kg (225 lb 3.2 oz)    2014  2:05 PM CDT     



                                         Weight   

 

                    181.6 cm (5' 11.5")    2014  2:05 PM CDT     



                                         Height   

 

                    30.97               2014  2:05 PM CDT     



                                         Body Mass Index   







Plan of Treatment







   



                 Health Maintenance     Due Date        Last Done       Comments

 

   



                           HEPATITIS C SCREENING     1949  

 

   



                           PHYSICAL (COMPREHENSIVE)     1956  



                                         EXAM   

 

   



                           DTAP/TDAP VACCINES (1 -     1967  



                                         Tdap)   

 

   



                           COLORECTAL CANCER         1999  



                                         SCREENING   

 

   



                           SHINGLES RECOMBINANT      1999  



                                         VACCINE (1 of 2)   

 

   



                           ABDOMINAL AORTIC ANEURYSM     2014  



                                         SCREENING   

 

   



                           PNEUMONIA (PCV13/PPSV23)     2014  



                                         VACCINES (1 of 2 - PCV13)   

 

   



                           INFLUENZA VACCINE         10/01/2019  







Results

Not on filefrom Last 3 Months [de-identified] : Ms. ALIVIA MAYER is a 63 year female who returns to office complaining of bilateral knee pain x 2 years with insidious onset.\par Denies specific injury, trauma, or fall to her knees.\par Pain is described as a constant dull/achy pain localized to the medial aspects of the joints, progressively worsening in nature.\par Pain is aggravated with activities that involve getting up from a seated position and with prolonged ambulation, especially up and down inclines.\par Pain is improved with rest. She has seen another orthopedist who has given her cortisone injections, last done 1 year ago, which did not help.\par Denies knee instability, laxity, giving way, numbness, or tingling.\par She returns to office today for Gel-One injections to be given to both knees.

## 2023-05-09 NOTE — REASON FOR VISIT
[Follow-Up Visit] : a follow-up visit for [Other: _____] : [unfilled] [FreeTextEntry2] : bilateral knee pain

## 2023-05-09 NOTE — PROCEDURE
[de-identified] : Procedure Note:\par \par Anatomic Location:  Right Knee\par \par Diagnosis:  Arthritis\par \par Procedure:  Injection 1 syringe of Gel-One\par \par Lot Number: [id number]    Expiration Date: 08-                                 \par \par Local Spray: Ethyl Chloride.\par \par Skin preparation with Alcohol.\par \par Patient has consented for the procedure.\par \par Injection  through a lateral parapatella approach.\par \par Patient tolerated the procedure well.\par \par \par \par \par Procedure Note:\par \par Anatomic Location:  Left Knee\par \par Diagnosis:  Arthritis\par \par Procedure:  Injection 1 syringe of Gel-One\par \par Lot Number: [id number]    Expiration Date: 08-                                 \par \par Local Spray: Ethyl Chloride.\par \par Skin preparation with Alcohol.\par \par Patient has consented for the procedure.\par \par Injection  through a lateral parapatella approach.\par \par Patient tolerated the procedure well.\par \par Patient instructed to call the office if any reaction, fever, chills, increased erythema or swelling.   [phone].

## 2023-05-18 ENCOUNTER — APPOINTMENT (OUTPATIENT)
Dept: OBGYN | Facility: CLINIC | Age: 63
End: 2023-05-18

## 2023-05-30 ENCOUNTER — APPOINTMENT (OUTPATIENT)
Dept: ORTHOPEDIC SURGERY | Facility: CLINIC | Age: 63
End: 2023-05-30
Payer: COMMERCIAL

## 2023-05-30 VITALS
HEIGHT: 68 IN | BODY MASS INDEX: 35.61 KG/M2 | HEART RATE: 86 BPM | DIASTOLIC BLOOD PRESSURE: 91 MMHG | WEIGHT: 235 LBS | SYSTOLIC BLOOD PRESSURE: 190 MMHG

## 2023-05-30 DIAGNOSIS — M94.279 CHONDROMALACIA, UNSPECIFIED ANKLE AND JOINTS OF FOOT: ICD-10-CM

## 2023-05-30 DIAGNOSIS — M21.42 FLAT FOOT [PES PLANUS] (ACQUIRED), LEFT FOOT: ICD-10-CM

## 2023-05-30 DIAGNOSIS — M21.41 FLAT FOOT [PES PLANUS] (ACQUIRED), RIGHT FOOT: ICD-10-CM

## 2023-05-30 PROCEDURE — 73600 X-RAY EXAM OF ANKLE: CPT | Mod: RT

## 2023-05-30 PROCEDURE — 99214 OFFICE O/P EST MOD 30 MIN: CPT

## 2023-05-30 PROCEDURE — 73630 X-RAY EXAM OF FOOT: CPT | Mod: RT

## 2023-06-08 ENCOUNTER — APPOINTMENT (OUTPATIENT)
Dept: ORTHOPEDIC SURGERY | Facility: CLINIC | Age: 63
End: 2023-06-08
Payer: COMMERCIAL

## 2023-06-08 VITALS
BODY MASS INDEX: 35.61 KG/M2 | WEIGHT: 235 LBS | OXYGEN SATURATION: 97 % | DIASTOLIC BLOOD PRESSURE: 85 MMHG | HEIGHT: 68 IN | TEMPERATURE: 96.8 F | SYSTOLIC BLOOD PRESSURE: 151 MMHG | HEART RATE: 90 BPM

## 2023-06-08 PROCEDURE — 20604 DRAIN/INJ JOINT/BURSA W/US: CPT | Mod: 50

## 2023-06-08 PROCEDURE — 99204 OFFICE O/P NEW MOD 45 MIN: CPT | Mod: 25

## 2023-06-08 RX ORDER — CELECOXIB 200 MG/1
200 CAPSULE ORAL DAILY
Qty: 30 | Refills: 0 | Status: ACTIVE | COMMUNITY
Start: 2023-01-04 | End: 1900-01-01

## 2023-06-08 NOTE — PROCEDURE
[de-identified] : Procedure:  Ultrasound Guided cortisone injection of the left talonavicular joint\par Indication for ultrasound guidance: Ensured placement within the joint, and avoidance of superficial neurovasculature and musculotendinous structures\par Indication for injection: Osteoarthritis\par \par Utlizing the Sonosite II Edge portable ultrasound machine, the Linear 25mm 10-5 MHz transducer, sterile probe cover and sterile ultrasound gel, ultrasound guidance with the probe in the longitudinal axis, utilizing an out of plane approach was used for the injection.  A discussion was had with the patient regarding this procedure and all questions were answered. All risks, benefits and alternatives were discussed. These included but were not limited to bleeding, infection, allergic reaction and reaccumulation of fluid. A timeout was done to ensure correct side and pt agreed to the procedure.  Alcohol was used to clean the skin, and betadine was used to sterilize and prep the area in the dorsal foot overlying the talonavicular joint. Ethyl chloride spray was then used as a topical anesthetic. A 25-gauge needle was used to inject  0.5cc of 0.25% bupivacaine and 1cc of 6mg/mL betamethasone into the flexor tendon sheath. A sterile bandage was then applied. The patient tolerated the procedure well and there were no complications\par \par __________\par \par Procedure:  Ultrasound Guided cortisone injection of the right talonavicular joint\par Indication for ultrasound guidance: Ensured placement within the joint, and avoidance of superficial neurovasculature and musculotendinous structures\par Indication for injection: Osteoarthritis\par \par Utlizing the Sonosite II Edge portable ultrasound machine, the Linear 25mm 10-5 MHz transducer, sterile probe cover and sterile ultrasound gel, ultrasound guidance with the probe in the longitudinal axis, utilizing an out of plane approach was used for the injection.  A discussion was had with the patient regarding this procedure and all questions were answered. All risks, benefits and alternatives were discussed. These included but were not limited to bleeding, infection, allergic reaction and reaccumulation of fluid. A timeout was done to ensure correct side and pt agreed to the procedure.  Alcohol was used to clean the skin, and betadine was used to sterilize and prep the area in the dorsal foot overlying the talonavicular joint. Ethyl chloride spray was then used as a topical anesthetic. A 25-gauge needle was used to inject  0.5cc of 0.25% bupivacaine and 1cc of 6mg/mL betamethasone into the flexor tendon sheath. A sterile bandage was then applied. The patient tolerated the procedure well and there were no complications\par

## 2023-06-08 NOTE — DISCUSSION/SUMMARY
[de-identified] : Discussed findings of today's exam and possible causes of patient's pain.  Educated patient on their most probable diagnosis of chronic intermittent foot pain with recent atraumatic exacerbation due to bilateral talonavicular joint osteoarthritis.  Patient elected to proceed with bilateral diagnostic/therapeutic ultrasound guided steroid injection today (see procedure note).  Patient advised to make note of whether their pain is improved starting in the next few minutes until 4-6 hours while the lidocaine numbs the interior of the talonavicular joint; this is the diagnostic part of the injection. If pain is relieved immediately that helps us determine that the etiology of the pain is coming from within the talonavicular joint. The steroid injected into the joint today will start to have an effect in the coming days, will be most effective in the next 1-2 weeks, and may last 1-2 months; that is the therapeutic portion of this injection.   The patient should follow up with Dr. Parker, in 1-2 months for further management.     Patient appreciates and agrees with current plan. \par \par This note was generated using dragon medical dictation software.  A reasonable effort has been made for proofreading its contents, but typos may still remain.  If there are any questions or points of clarification needed please notify my office.

## 2023-06-08 NOTE — HISTORY OF PRESENT ILLNESS
[de-identified] : Patient has longstanding history of chronic bilateral foot pain.  She was recently seen by my orthopedic associate who diagnosed her with talonavicular joint osteoarthritis and recommended diagnostic/therapeutic cortisone injections.  Patient is traveling to [place] next week and she is requesting injections today.

## 2023-06-08 NOTE — PHYSICAL EXAM
[de-identified] : Constitutional: Well-nourished, well-developed, No acute distress\par Respiratory:  Good respiratory effort, no SOB\par Lymphatic: No regional lymphadenopathy, no lymphedema\par Psychiatric: Pleasant and normal affect, alert and oriented x3\par Musculoskeletal: normal except where as noted in regional exam\par \par Bilateral feet foot:\par APPEARANCE: Pes planus, mild overlying dorsum of foot swelling, no marked deformities or malalignment\par POSITIVE TENDERNESS: + Dorsal talonavicular joint\par NONTENDER: 5th metatarsal base, cuboid, 1st MTP, dorsum & plantar surfaces, medial heel, mid heel. \par ROM: normal throughout foot, ankle, and digits. \par RESISTIVE TESTING: painless flex/ext, abd/add of all digits. \par

## 2023-07-13 ENCOUNTER — APPOINTMENT (OUTPATIENT)
Dept: ORTHOPEDIC SURGERY | Facility: CLINIC | Age: 63
End: 2023-07-13

## 2023-07-26 ENCOUNTER — APPOINTMENT (OUTPATIENT)
Dept: ORTHOPEDIC SURGERY | Facility: CLINIC | Age: 63
End: 2023-07-26
Payer: COMMERCIAL

## 2023-07-26 DIAGNOSIS — M70.50 OTHER BURSITIS OF KNEE, UNSPECIFIED KNEE: ICD-10-CM

## 2023-07-26 PROCEDURE — 99213 OFFICE O/P EST LOW 20 MIN: CPT

## 2023-07-26 RX ORDER — NAPROXEN 500 MG/1
500 TABLET ORAL
Qty: 60 | Refills: 2 | Status: ACTIVE | COMMUNITY
Start: 2023-07-26 | End: 1900-01-01

## 2023-07-26 NOTE — HISTORY OF PRESENT ILLNESS
[de-identified] : Ms. ALIVIA MAYER is a 63 year female who returns to office complaining of bilateral knee pain x 2 years with insidious onset.\par Denies specific injury, trauma, or fall to her knees.\par Pain is described as a constant dull/achy pain localized to the medial aspects of the joints, progressively worsening in nature.\par Pain is aggravated with activities that involve getting up from a seated position and with prolonged ambulation, especially up and down inclines.\par Pain is improved with rest. She has seen another orthopedist who has given her cortisone injections, last done 1 year ago, which did not help.\par Most recently, she received Gel-One injections to both knees on 5/9/23 which did greatly help her pains.\par Denies knee instability, laxity, giving way, numbness, or tingling.\par Her main complaint at this time is intermittent inferomedial knee pain (L > R) consistent with pes anserine bursitis. She has not done any specific treatment for this.

## 2023-07-26 NOTE — PHYSICAL EXAM
[de-identified] : Constitutional - the patient is of normal build and overweight with a BMI of 34.7.\par \par Motion\par Right Knee has 0 to 120 degrees of motion.  She has good medial lateral and anterior posterior stability.  She does not have a major effusion and no Baker's cyst.  He is doing much better in this knee since the injections with hyaluronic acid.  She does have some patellofemoral crepitus and some discomfort with compression of her patella and slight discomfort over the medial joint line.  She also complains of some discomfort around the pes anserine bursa intermittently but in generally she is doing well.  She has less problems with her right knee than her left. \par    \par         \par Left knee her left knee is having more discomfort than the right here she goes from 0 to 120 degrees but has this somewhat more discomfort when flexing past 110 degrees she has some tenderness over the medial joint line and slight tenderness over the pes anserine bursa area.  She got a good result also from the hyaluronic acid and would like to stay on conservative measures at this time.  Her patella tracks well but she does have patellofemoral crepitus. \par \par

## 2023-07-26 NOTE — DISCUSSION/SUMMARY
[de-identified] : This patient has bilateral knee arthritis.  She will try to bring her weight down.  She is doing much better since the injection with hyaluronic acid.  She also has some pes anserine bursitis in each knee but she is managing well at this time she will return in 4 months and I would like to consider repeating the hyaluronic acid.  If her knees become worse she will come in sooner.

## 2023-08-20 NOTE — H&P PST ADULT - TEACHING/LEARNING FACTORS IMPACT ABILITY TO LEARN
Mrs. Hazel Wright is a 41 year old women with a PMH of T2DM, Asthma, Hemorroids, Premature Menopause, and MARIANNE presenting for neutropenic fever in the presence of AML.   41 year old female with MHx of Type 2 DM, Asthma, Eczema, premature menopause, and MARIANNE (on CPAP)  a/w neutropenic fever and bicytopenia with 17% blasts; Evaluated by Hematology/Oncology, new acute myeloid leukemia;  none

## 2023-08-21 ENCOUNTER — APPOINTMENT (OUTPATIENT)
Dept: VASCULAR SURGERY | Facility: CLINIC | Age: 63
End: 2023-08-21
Payer: COMMERCIAL

## 2023-08-21 VITALS
HEIGHT: 67 IN | DIASTOLIC BLOOD PRESSURE: 74 MMHG | OXYGEN SATURATION: 95 % | RESPIRATION RATE: 16 BRPM | SYSTOLIC BLOOD PRESSURE: 137 MMHG | BODY MASS INDEX: 36.88 KG/M2 | HEART RATE: 94 BPM | WEIGHT: 235 LBS | TEMPERATURE: 98.4 F

## 2023-08-21 DIAGNOSIS — I87.2 VENOUS INSUFFICIENCY (CHRONIC) (PERIPHERAL): ICD-10-CM

## 2023-08-21 DIAGNOSIS — R60.0 LOCALIZED EDEMA: ICD-10-CM

## 2023-08-21 PROCEDURE — 93970 EXTREMITY STUDY: CPT

## 2023-08-21 PROCEDURE — 99203 OFFICE O/P NEW LOW 30 MIN: CPT

## 2023-09-01 ENCOUNTER — APPOINTMENT (OUTPATIENT)
Dept: MAMMOGRAPHY | Facility: IMAGING CENTER | Age: 63
End: 2023-09-01

## 2023-09-01 ENCOUNTER — APPOINTMENT (OUTPATIENT)
Dept: ULTRASOUND IMAGING | Facility: IMAGING CENTER | Age: 63
End: 2023-09-01

## 2023-09-22 ENCOUNTER — APPOINTMENT (OUTPATIENT)
Dept: ORTHOPEDIC SURGERY | Facility: CLINIC | Age: 63
End: 2023-09-22

## 2023-10-16 ENCOUNTER — APPOINTMENT (OUTPATIENT)
Dept: ORTHOPEDIC SURGERY | Facility: CLINIC | Age: 63
End: 2023-10-16
Payer: COMMERCIAL

## 2023-10-16 ENCOUNTER — NON-APPOINTMENT (OUTPATIENT)
Age: 63
End: 2023-10-16

## 2023-10-16 VITALS — BODY MASS INDEX: 34.56 KG/M2 | HEIGHT: 68 IN | WEIGHT: 228 LBS

## 2023-10-16 DIAGNOSIS — M76.822 POSTERIOR TIBIAL TENDINITIS, LEFT LEG: ICD-10-CM

## 2023-10-16 DIAGNOSIS — M19.071 PRIMARY OSTEOARTHRITIS, RIGHT ANKLE AND FOOT: ICD-10-CM

## 2023-10-16 DIAGNOSIS — M62.89 OTHER SPECIFIED DISORDERS OF MUSCLE: ICD-10-CM

## 2023-10-16 DIAGNOSIS — M19.072 PRIMARY OSTEOARTHRITIS, RIGHT ANKLE AND FOOT: ICD-10-CM

## 2023-10-16 DIAGNOSIS — M76.821 POSTERIOR TIBIAL TENDINITIS, RIGHT LEG: ICD-10-CM

## 2023-10-16 PROCEDURE — 99213 OFFICE O/P EST LOW 20 MIN: CPT

## 2023-10-17 ENCOUNTER — APPOINTMENT (OUTPATIENT)
Dept: ORTHOPEDIC SURGERY | Facility: CLINIC | Age: 63
End: 2023-10-17

## 2023-10-19 ENCOUNTER — APPOINTMENT (OUTPATIENT)
Dept: ORTHOPEDIC SURGERY | Facility: CLINIC | Age: 63
End: 2023-10-19

## 2023-11-07 RX ORDER — HYALURONATE SOD, CROSS-LINKED 30 MG/3 ML
30 SYRINGE (ML) INTRAARTICULAR
Qty: 2 | Refills: 0 | Status: ACTIVE | COMMUNITY
Start: 2023-11-07

## 2023-11-08 ENCOUNTER — APPOINTMENT (OUTPATIENT)
Dept: ORTHOPEDIC SURGERY | Facility: CLINIC | Age: 63
End: 2023-11-08
Payer: COMMERCIAL

## 2023-11-08 VITALS
HEART RATE: 94 BPM | DIASTOLIC BLOOD PRESSURE: 85 MMHG | BODY MASS INDEX: 34.56 KG/M2 | HEIGHT: 68 IN | WEIGHT: 228 LBS | SYSTOLIC BLOOD PRESSURE: 139 MMHG

## 2023-11-08 DIAGNOSIS — M21.41 FLAT FOOT [PES PLANUS] (ACQUIRED), RIGHT FOOT: ICD-10-CM

## 2023-11-08 DIAGNOSIS — M19.079 PRIMARY OSTEOARTHRITIS, UNSPECIFIED ANKLE AND FOOT: ICD-10-CM

## 2023-11-08 DIAGNOSIS — M21.42 FLAT FOOT [PES PLANUS] (ACQUIRED), RIGHT FOOT: ICD-10-CM

## 2023-11-08 PROCEDURE — 20610 DRAIN/INJ JOINT/BURSA W/O US: CPT | Mod: 50

## 2023-11-08 PROCEDURE — 99214 OFFICE O/P EST MOD 30 MIN: CPT | Mod: 25

## 2023-11-21 ENCOUNTER — APPOINTMENT (OUTPATIENT)
Dept: ORTHOPEDIC SURGERY | Facility: CLINIC | Age: 63
End: 2023-11-21

## 2023-12-18 PROBLEM — R60.0 BILATERAL LEG EDEMA: Status: ACTIVE | Noted: 2023-12-18

## 2023-12-18 PROBLEM — I87.2 VENOUS INSUFFICIENCY: Status: ACTIVE | Noted: 2023-12-18

## 2023-12-18 NOTE — PHYSICAL EXAM
[Normal Rate and Rhythm] : normal rate and rhythm [2+] : left 2+ [Ankle Swelling (On Exam)] : present [Ankle Swelling Bilaterally] : bilaterally  [Varicose Veins Of Lower Extremities] : bilaterally [] : bilaterally [Ankle Swelling On The Left] : moderate [Abdomen Tenderness] : ~T ~M No abdominal tenderness [Alert] : alert [Oriented to Person] : oriented to person [Oriented to Place] : oriented to place [Oriented to Time] : oriented to time [Calm] : calm [de-identified] : NAD [de-identified] : supple, no masses [de-identified] : unlabored breathing [de-identified] : .frs

## 2023-12-18 NOTE — REASON FOR VISIT
[Initial Evaluation] : an initial evaluation [FreeTextEntry1] : patient states is being referred by Dr Chirinos for pain and stiffness on both legs.

## 2023-12-18 NOTE — ASSESSMENT
[FreeTextEntry1] : 63-year-old female referred for bilateral lower extremity swelling and discomfort.  Venous duplex in office today demonstrates bilateral GSV reflux greater than 2 seconds and right lower extremity deep venous insufficiency. No evidence of DVT.  Plan Leg elevation above heart level and compression stockings daily are encouraged while seated to manage edema and symptoms A radiofrequency ablation of the bilateral GSV is recommended to alleviate her symptoms and prevent future complications of uncontrolled venous hypertension, such as bleeding or ulceration Risks and complications of the procedure were explained to the patient including bleeding, infection, skin necrosis, neuropathy, DVT, PE, and she is agreeable to proceed Will schedule the procedure per patient's availability    [Arterial/Venous Disease] : arterial/venous disease [Foot care/Footwear] : foot care/footwear

## 2023-12-18 NOTE — HISTORY OF PRESENT ILLNESS
[FreeTextEntry1] : 63-year-old female referred for bilateral lower extremity swelling and discomfort. She experiences heaviness, swelling, fatigue, and large varicose veins while sitting and standing.  Denies history of DVT or SVT or trauma. Pt has worn compression stockings for past 6 months and elevated legs without improvement. Denies recent weight gain. No history of hypercoagulable disorder. Denies claudication or rest pain or ulcers. Pt exercises regularly with moderate walking. Denies chest pain, SOB, dyspnea on exertion, or orthopnea.

## 2024-01-09 ENCOUNTER — APPOINTMENT (OUTPATIENT)
Dept: ORTHOPEDIC SURGERY | Facility: CLINIC | Age: 64
End: 2024-01-09
Payer: COMMERCIAL

## 2024-01-09 VITALS — BODY MASS INDEX: 34.86 KG/M2 | HEIGHT: 68 IN | WEIGHT: 230 LBS

## 2024-01-09 DIAGNOSIS — M25.579 PAIN IN UNSPECIFIED ANKLE AND JOINTS OF UNSPECIFIED FOOT: ICD-10-CM

## 2024-01-09 DIAGNOSIS — G89.29 PAIN IN UNSPECIFIED ANKLE AND JOINTS OF UNSPECIFIED FOOT: ICD-10-CM

## 2024-01-09 PROCEDURE — 20610 DRAIN/INJ JOINT/BURSA W/O US: CPT | Mod: 50

## 2024-01-09 PROCEDURE — 99214 OFFICE O/P EST MOD 30 MIN: CPT | Mod: 25

## 2024-03-22 ENCOUNTER — APPOINTMENT (OUTPATIENT)
Dept: MAMMOGRAPHY | Facility: IMAGING CENTER | Age: 64
End: 2024-03-22
Payer: COMMERCIAL

## 2024-03-22 ENCOUNTER — APPOINTMENT (OUTPATIENT)
Dept: ULTRASOUND IMAGING | Facility: IMAGING CENTER | Age: 64
End: 2024-03-22
Payer: COMMERCIAL

## 2024-03-22 ENCOUNTER — OUTPATIENT (OUTPATIENT)
Dept: OUTPATIENT SERVICES | Facility: HOSPITAL | Age: 64
LOS: 1 days | End: 2024-03-22
Payer: COMMERCIAL

## 2024-03-22 DIAGNOSIS — Z12.39 ENCOUNTER FOR OTHER SCREENING FOR MALIGNANT NEOPLASM OF BREAST: ICD-10-CM

## 2024-03-22 PROCEDURE — 76641 ULTRASOUND BREAST COMPLETE: CPT

## 2024-03-22 PROCEDURE — 77067 SCR MAMMO BI INCL CAD: CPT | Mod: 26

## 2024-03-22 PROCEDURE — 76641 ULTRASOUND BREAST COMPLETE: CPT | Mod: 26,50

## 2024-03-22 PROCEDURE — 77067 SCR MAMMO BI INCL CAD: CPT

## 2024-03-22 PROCEDURE — 77063 BREAST TOMOSYNTHESIS BI: CPT | Mod: 26

## 2024-03-22 PROCEDURE — 77063 BREAST TOMOSYNTHESIS BI: CPT

## 2024-03-27 ENCOUNTER — APPOINTMENT (OUTPATIENT)
Dept: MAMMOGRAPHY | Facility: IMAGING CENTER | Age: 64
End: 2024-03-27
Payer: COMMERCIAL

## 2024-03-27 ENCOUNTER — OUTPATIENT (OUTPATIENT)
Dept: OUTPATIENT SERVICES | Facility: HOSPITAL | Age: 64
LOS: 1 days | End: 2024-03-27
Payer: COMMERCIAL

## 2024-03-27 DIAGNOSIS — Z00.8 ENCOUNTER FOR OTHER GENERAL EXAMINATION: ICD-10-CM

## 2024-03-27 PROCEDURE — 77065 DX MAMMO INCL CAD UNI: CPT | Mod: 26,LT

## 2024-03-27 PROCEDURE — G0279: CPT

## 2024-03-27 PROCEDURE — 77065 DX MAMMO INCL CAD UNI: CPT

## 2024-03-27 PROCEDURE — 77061 BREAST TOMOSYNTHESIS UNI: CPT | Mod: 26

## 2024-03-27 PROCEDURE — G0279: CPT | Mod: 26

## 2024-04-10 ENCOUNTER — APPOINTMENT (OUTPATIENT)
Dept: OBGYN | Facility: CLINIC | Age: 64
End: 2024-04-10

## 2024-04-10 ENCOUNTER — APPOINTMENT (OUTPATIENT)
Dept: ORTHOPEDIC SURGERY | Facility: CLINIC | Age: 64
End: 2024-04-10

## 2024-04-25 ENCOUNTER — EMERGENCY (EMERGENCY)
Facility: HOSPITAL | Age: 64
LOS: 1 days | Discharge: HOME | End: 2024-04-25
Attending: STUDENT IN AN ORGANIZED HEALTH CARE EDUCATION/TRAINING PROGRAM
Payer: SELF-PAY

## 2024-04-25 VITALS
DIASTOLIC BLOOD PRESSURE: 87 MMHG | RESPIRATION RATE: 16 BRPM | HEIGHT: 68 IN | HEART RATE: 93 BPM | WEIGHT: 229.94 LBS | SYSTOLIC BLOOD PRESSURE: 146 MMHG | TEMPERATURE: 98 F | OXYGEN SATURATION: 100 %

## 2024-04-25 PROCEDURE — 99285 EMERGENCY DEPT VISIT HI MDM: CPT

## 2024-04-25 PROCEDURE — 93971 EXTREMITY STUDY: CPT | Mod: 26,LT

## 2024-04-25 RX ORDER — FAMOTIDINE 10 MG/ML
20 INJECTION INTRAVENOUS ONCE
Refills: 0 | Status: COMPLETED | OUTPATIENT
Start: 2024-04-25 | End: 2024-04-25

## 2024-04-25 RX ADMIN — Medication 30 MILLILITER(S): at 23:54

## 2024-04-25 RX ADMIN — FAMOTIDINE 20 MILLIGRAM(S): 10 INJECTION INTRAVENOUS at 23:54

## 2024-04-25 NOTE — ED PROVIDER NOTE - ATTENDING CONTRIBUTION TO CARE
Brian Mirza DO: I have personally performed a face to face medical and diagnostic evaluation of the patient. I have discussed with and reviewed the Resident's and/or ACP's and/or Medical/PA/NP student's note and agree with the History, ROS, Physical Exam and MDM unless otherwise indicated. A brief summary of my personal evaluation and impression can be found below.     64-year-old female history of HTN, HLD, GERD, oophorectomy presenting to the ED for 3 days of generalized chest discomfort and uneasiness, not associated with exertion.  No shortness of breath, nausea, vomiting, diaphoresis.  Notes it feels different from her indigestion.  Last stress echo was over 8 years ago.  Also complaining of  left leg pain that radiates from buttocks down to the leg.  Patient had ultrasound study a year ago and is concerned today for ultrasound and would like 1.   follows with PT for leg pain.    CONSTITUTIONAL: NAD  SKIN: Warm dry, normal skin turgor  HEAD: NCAT  EYES: EOMI, PERRLA, no scleral icterus, conjunctiva pink  NECK: Supple; non tender. Full ROM.  CARD: RRR  RESP: No respiratory distress  ABD: non-distended  MSK: Full ROM, no leg swelling, mild lle pain.   PSYCH: Cooperative, appropriate.     story mildly concerning for acs, will get trop ekg and cp workup, LLE pain will get dvt study to r/o dvt and dimer to r/o pe, gerd/acid reflux probable but acs more concerning. Less likely infectious in etiology. If workup in ED unremarkable recommend CDU for further cardiac workup

## 2024-04-25 NOTE — ED ADULT NURSE NOTE - NSICDXPASTSURGICALHX_GEN_ALL_CORE_FT
[facility]  Video visit, Hospital Follow-Up  Zoom Mtg. I.D. [phone]   Passcode [phone]    Staff = Elva Mistry consented verbally to video appointment.  The patient could not come to the clinic due to being high risk for serious complications if exposed to COVID-19.  Consent for video visit was verified including risk of electronic data, possibility of equipment failure or need for in person visit if condition cannot be fully assessed by video visit.    This visit was performed via live interactive two-way video with a secure connection. This visit was not recorded or stored.  The patient/representative was informed that their consent to treat includes permission to submit claims to their insurance on their behalf for the services received.  Clinician Location:   [facility] [address] [address]  Present on the video:  Patient's sister = Lyle  Patient's mom = Radha  Patient = Remedios  Dog = Ashley  Location:  Extended stay through [facility] = [address]    Patient ID: Remedios is a 48 year old female.    No chief complaint on file.    HPI    Medical issues discussed:    Remedios is a 48-year-old woman with Down syndrome who lives in a group home in [place] though [facility] with 3 other higher-functioning women, Vickie and Beatrice and [place] (Remedios moved there in 2012ish from another group home through [facility] in [place]). Remedios has her own bedroom.      She goes to an adult day [facility] - activities and outings - off since mid-March due to the pandemic, they started a slow roll-in, October 8th    Remedios went to [facility] 10/23  She had fallen on the floor in the morning  Shallow breathing   Put a cool compress on her head  Called 911  CT, urine, labs were reported as fine  They told them that dizziness was associated with COVID     exposed to Covid 19 through contact with 4 individuals,  roommates and a staff.  She was taken for a Covid test on Monday October 19th -  resulted as positive Oct. 27th  Tender left lower abdomen, decreased appetite, dry  Pushing liquids, water and juices \"but she still seems dry\"  No vomiting  No fevers    Hospitalization [facility] in [place] 10/27 to 11/3    Was on oxygen first 3 days  Got remdesivir 5 days  Last 2 days she was off oxygen  On steriods  She's doing much better    Eating well  A little belly pain on the left but much improved    No fevers    Stool softener in the morning, may be better for her in the evening    Tylenol for arthritis twice a day    Decrease in depth perception    Memory - slower, short-term mostly  Noticed while here    Mom drove with the dad to [place]    Remedios has a past medical history of Constipation, ESBL (extended spectrum beta-lactamase) producing bacteria infection, Insomnia (12/11/2018), Iron deficiency anemia (10/20/2010), Morbid (severe) obesity due to excess calories (CMS/HCC) (9/1/2016), Pneumonia (9/30/2011), and Varicella.  Remedios has Acne; Allergic rhinitis; Cardiomegaly; Changes in skin texture; Constipation; Down's syndrome; Elevated ferritin; Encounter for immunization; Fatty liver; Furunculosis; GERD (gastroesophageal reflux disease); Heart murmur; History of corneal transplant; History of echocardiogram; History of EKG; Hypercholesterolemia; Hypothyroidism; Knee arthropathy; Lactase deficiency; Menopause; Onychomycosis of toenail; Ovarian cyst; Seborrheic dermatitis of scalp; Obstructive sleep apnea syndrome; Bladder wall thickening; History of ultrasound, transvaginal; Vitamin D deficiency; Wears eyeglasses; Elevated lipase; Hydradenitis; Insomnia; Change in behavior; Obesity (BMI 30-39.9); COVID-19 virus infection; Leukopenia; Thrombocytopenia (CMS/HCC); and LLQ abdominal pain on their problem list.  Remedios has a past surgical history that includes Appendectomy (06/2013); Cholecystectomy (06/2013); Corneal  transplant (Right); Oophorectomy (06/2013); and Femur fracture surgery.  Her family history is not on file.  Remedios reports that she has never smoked. She has never used smokeless tobacco. She reports that she does not drink alcohol or use drugs.  Remedios has a current medication list which includes the following prescription(s): dexamethasone, aspirin, vitamin c, zinc methionate, guaifenesin, vitamin d, urea, levothyroxine, acetaminophen, docusate sodium, carboxymethylcellulose sodium, loperamide, albuterol sulfate, phenylephrine, sodium fluoride, meloxicam, melatonin er, nystatin, triamcinolone, and eucerin.  Remedios   Current Outpatient Medications   Medication Sig Dispense Refill   • dexamethasone (DECADRON) 6 MG tablet      • aspirin (ECOTRIN) 81 MG EC tablet Take 1 tablet by mouth daily. For 2 weeks. 14 tablet 0   • Ascorbic Acid (vitamin C) 1000 MG tablet Take 1 tablet by mouth daily. 14 tablet 0   • zinc methionate 50 MG capsule Take 1 capsule by mouth daily. 14 capsule 0   • guaiFENesin (Robafen) 100 MG/5ML syrup Take 10 mLs by mouth 3 times daily as needed for Cough or Congestion. 236 mL 1   • Cholecalciferol (vitamin D) 50 mcg (2,000 units) capsule Take 1 capsule by mouth daily. 30 capsule 3   • urea (CARMOL 20) 20 % cream Apply topically daily. To dry skin on ankles and feet. 85 g 3   • levothyroxine 88 MCG tablet Take 1 tablet by mouth daily in the morning 30 min before 1st food/ meds/drink 31 tablet 0   • acetaminophen (TYLENOL 8 HOUR ARTHRITIS PAIN) 650 MG CR tablet Take 1 tablet by mouth 2 times daily. 62 tablet 5   • docusate sodium (COLACE) 100 MG capsule Take 1 capsule by mouth daily. 90 capsule 3   • Hypromellose (ARTIFICIAL TEARS OP)      • loperamide (IMODIUM) 2 MG capsule Take 2 mg by mouth 4 times daily as needed for Diarrhea.     • Albuterol Sulfate (PROAIR HFA IN) Inhale 108 mcg into the lungs every 4 hours as needed (shortness of breath/wheezing/chest pain).     • phenylephrine (SUDOGEST PE) 10 MG  Tab Take 10 mg by mouth daily as needed. Indications: Stuffy Nose, Congestion of the Sinuses Around the Nose     • sodium fluoride (FLUORIDEX) 1.1 % dental paste Apply pea size amount on toothbursh and brush 3 times a week at bedtime. Brush, spit but do not rinse for half an hour.     • meloxicam (MOBIC) 7.5 MG tablet Take 1 tablet by mouth daily as needed (knee pain).     • Melatonin ER 5 MG Tab CR Take 5 mg by mouth nightly. (Optimize Sleep) 30 tablet 10   • nystatin (MYCOSTATIN) 514192 UNIT/GM powder Apply after showering daily to affected skin areas.     • triamcinolone (ARISTOCORT) 0.1 % cream Apply 1 applicator topically.     • Emollient (EUCERIN) lotion natural lotion, sister to supply, use nightly  and as needed to affected areas       No current facility-administered medications for this visit.      Remedios has No Known Allergies.    Review of Systems    Objective   There were no vitals taken for this visit.    Pertinent findings:     Patient sitting in chair, facial features of Down syndrome, anicteric, communicative and responsive to questions, calm & very pleasnat, no shortness of breath, no cough, no abnormal movements, obesity    Assessment   Problem List Items Addressed This Visit        Digestive    Constipation    Relevant Medications    docusate sodium (Colace) 100 MG capsule       Other    Down's syndrome    COVID-19 virus infection - Primary          Plan:     On dexamethasone  To stay in hotel with mom and sister until Monday    Change stool softener to the morning - sent to [facility]    Knee pain likely better due to Dexamethasone  Continue Tylenol upon return to group home, can try off it at some time if desired, I'll continue to monitor her liver for drug monitoring    When she turns to the house, stop aspirin 81 mg daily, vitamin C and zinc  She will also be off dexamethasone by then as well    Discussed memory concerns - may be from untreated sleep apnea, increased age, cannot rule  out earlier Alzheimer's disease risk due to Down syndrome    Discussed increased problems with depth perception in aging in people with Down syndrome    I will have this note faxed to her agency    Schedule follow up: as needed - Instructed to call if symptoms worsens, do not improve or new symptoms arise    Selina Joel MD       Start time: 4:25 p.m.  End time: 4:58 p.m.    Video Visit replaces face-to-face encounter according to Federal National State of Emergency related to COVID-19.   PAST SURGICAL HISTORY:  No significant past surgical history

## 2024-04-25 NOTE — ED ADULT NURSE NOTE - CHIEF COMPLAINT QUOTE
indigestion x1 week w/ chest and back discomfort w/ LLE swelling. Pt went to  and sent to ED for eval for abnormal EKG from previous on file.

## 2024-04-25 NOTE — ED PROVIDER NOTE - PROGRESS NOTE DETAILS
Twin Garcia, PGY3 Patient's labs show no acute findings.  No DVT on ultrasound.  Will admit to CDU for further cards workup.  Patient agreement with plan.

## 2024-04-25 NOTE — ED ADULT NURSE NOTE - OBJECTIVE STATEMENT
pt is a 65yo female PMH HTN, thyroid disease, presenting to the ED complaining of indigestion. pt reports about 1 week of indigestion, reports feeling like there is "air trapped in my chest", pt reports frequent burping/belching, has been taking mylanta with minimal relief achieved. pt also reports worsening swelling, weakness, and pain to the LLE, pt describes pain as intermittently radiating throughout the entire LLE, states that pt sometimes radiates into the middle lower back as well, denies any relief with OTC pain medications. pt sent to ED from  for additional evaluation. pt describes PO intake as normal per baseline. pt A&Ox3 gross neuro intact, no difficulty speaking in complete sentences, heard, pulses x 4, ramirez x4, abdomen soft nontender nondistended, skin intact. pt denies sob, ha, n/v/d, abdominal pain, f/c, urinary symptoms, hematuria

## 2024-04-25 NOTE — ED PROVIDER NOTE - PHYSICAL EXAMINATION
Const: Well-nourished, Well-developed, appearing stated age.  Eyes: no conjunctival injection, and symmetrical lids.  HEENT: Head NCAT, no lesions. Atraumatic external nose and ears. Moist MM.  Neck: Symmetric, trachea midline.   CVS: +S1/S2  RESP: Unlabored respiratory effort. Clear to auscultation bilaterally.  GI: Nontender/Nondistended  MSK: Normocephalic/Atraumatic, Lower Extremities w/o edema b/l.   Little to no tenderness of calf palpation  Skin: Warm, dry and intact.   Neuro: CNs II-XII grossly intact. Motor & Sensation grossly intact.  Psych: Awake, Alert, & Oriented (AAO) x3. Appropriate mood and affect.

## 2024-04-25 NOTE — ED ADULT NURSE NOTE - NSFALLUNIVINTERV_ED_ALL_ED
Bed/Stretcher in lowest position, wheels locked, appropriate side rails in place/Call bell, personal items and telephone in reach/Instruct patient to call for assistance before getting out of bed/chair/stretcher/Non-slip footwear applied when patient is off stretcher/[place] to call system/Physically safe environment - no spills, clutter or unnecessary equipment/Purposeful proactive rounding/Room/bathroom lighting operational, light cord in reach

## 2024-04-25 NOTE — ED PROVIDER NOTE - CLINICAL SUMMARY MEDICAL DECISION MAKING FREE TEXT BOX
64-year-old female history of HTN, HLD, GERD, oophorectomy presenting to the ED for 3 days of generalized chest discomfort and uneasiness, not associated with exertion.  No shortness of breath, nausea, vomiting, diaphoresis.  Notes it feels different from her indigestion.  Last stress echo was over 8 years ago.  Also complaining of  left leg pain that radiates from buttocks down to the leg.  Patient had ultrasound study a year ago and is concerned today for ultrasound and would like 1.   follows with PT for leg pain.   will get ACS workup, give indigestion meds and likely keep in [place] for further cardiac workup.  Patient also requesting DVT study however suspicion for DVT is low. 64-year-old female history of HTN, HLD, GERD, oophorectomy presenting to the ED for 3 days of generalized chest discomfort and uneasiness, not associated with exertion.  No shortness of breath, nausea, vomiting, diaphoresis.  Notes it feels different from her indigestion.  Last stress echo was over 8 years ago.  Also complaining of  left leg pain that radiates from buttocks down to the leg.  Patient had ultrasound study a year ago and is concerned today for ultrasound and would like 1.   follows with PT for leg pain.   will get ACS workup, give indigestion meds and likely keep in [facility] for further cardiac workup.  Patient also requesting DVT study however suspicion for DVT is low.

## 2024-04-25 NOTE — ED PROVIDER NOTE - OBJECTIVE STATEMENT
64-year-old female history of HTN, HLD, GERD, oophorectomy presenting to the ED for 3 days of generalized chest discomfort and uneasiness, not associated with exertion.  No shortness of breath, nausea, vomiting, diaphoresis.  Notes it feels different from her indigestion.  Last stress echo was over 8 years ago.  Also complaining of  left leg pain that radiates from buttocks down to the leg.  Patient had ultrasound study a year ago and is concerned today for ultrasound and would like 1.   follows with PT for leg pain.

## 2024-04-26 ENCOUNTER — RESULT REVIEW (OUTPATIENT)
Age: 64
End: 2024-04-26

## 2024-04-26 VITALS
SYSTOLIC BLOOD PRESSURE: 128 MMHG | OXYGEN SATURATION: 98 % | DIASTOLIC BLOOD PRESSURE: 75 MMHG | TEMPERATURE: 98 F | HEART RATE: 76 BPM | RESPIRATION RATE: 18 BRPM

## 2024-04-26 LAB
A1C WITH ESTIMATED AVERAGE GLUCOSE RESULT: 7.2 % — HIGH (ref 4–5.6)
ALBUMIN SERPL ELPH-MCNC: 4.1 G/DL — SIGNIFICANT CHANGE UP (ref 3.3–5)
ALP SERPL-CCNC: 82 U/L — SIGNIFICANT CHANGE UP (ref 40–120)
ALT FLD-CCNC: 7 U/L — LOW (ref 10–45)
ANION GAP SERPL CALC-SCNC: 10 MMOL/L — SIGNIFICANT CHANGE UP (ref 5–17)
AST SERPL-CCNC: 12 U/L — SIGNIFICANT CHANGE UP (ref 10–40)
BASOPHILS # BLD AUTO: 0.02 K/UL — SIGNIFICANT CHANGE UP (ref 0–0.2)
BASOPHILS NFR BLD AUTO: 0.3 % — SIGNIFICANT CHANGE UP (ref 0–2)
BILIRUB SERPL-MCNC: 0.3 MG/DL — SIGNIFICANT CHANGE UP (ref 0.2–1.2)
BUN SERPL-MCNC: 16 MG/DL — SIGNIFICANT CHANGE UP (ref 7–23)
CALCIUM SERPL-MCNC: 9.4 MG/DL — SIGNIFICANT CHANGE UP (ref 8.4–10.5)
CHLORIDE SERPL-SCNC: 103 MMOL/L — SIGNIFICANT CHANGE UP (ref 96–108)
CHOLEST SERPL-MCNC: 121 MG/DL — SIGNIFICANT CHANGE UP
CO2 SERPL-SCNC: 27 MMOL/L — SIGNIFICANT CHANGE UP (ref 22–31)
CREAT SERPL-MCNC: 0.74 MG/DL — SIGNIFICANT CHANGE UP (ref 0.5–1.3)
D DIMER BLD IA.RAPID-MCNC: 151 NG/ML DDU — SIGNIFICANT CHANGE UP
EGFR: 90 ML/MIN/1.73M2 — SIGNIFICANT CHANGE UP
EOSINOPHIL # BLD AUTO: 0.17 K/UL — SIGNIFICANT CHANGE UP (ref 0–0.5)
EOSINOPHIL NFR BLD AUTO: 2.4 % — SIGNIFICANT CHANGE UP (ref 0–6)
ESTIMATED AVERAGE GLUCOSE: 160 MG/DL — HIGH (ref 68–114)
GLUCOSE SERPL-MCNC: 253 MG/DL — HIGH (ref 70–99)
HCT VFR BLD CALC: 33.5 % — LOW (ref 34.5–45)
HDLC SERPL-MCNC: 57 MG/DL — SIGNIFICANT CHANGE UP
HGB BLD-MCNC: 10.6 G/DL — LOW (ref 11.5–15.5)
IMM GRANULOCYTES NFR BLD AUTO: 0.3 % — SIGNIFICANT CHANGE UP (ref 0–0.9)
LIDOCAIN IGE QN: 29 U/L — SIGNIFICANT CHANGE UP (ref 7–60)
LIPID PNL WITH DIRECT LDL SERPL: 53 MG/DL — SIGNIFICANT CHANGE UP
LYMPHOCYTES # BLD AUTO: 2.08 K/UL — SIGNIFICANT CHANGE UP (ref 1–3.3)
LYMPHOCYTES # BLD AUTO: 29.5 % — SIGNIFICANT CHANGE UP (ref 13–44)
MAGNESIUM SERPL-MCNC: 2.3 MG/DL — SIGNIFICANT CHANGE UP (ref 1.6–2.6)
MCHC RBC-ENTMCNC: 29.9 PG — SIGNIFICANT CHANGE UP (ref 27–34)
MCHC RBC-ENTMCNC: 31.6 GM/DL — LOW (ref 32–36)
MCV RBC AUTO: 94.6 FL — SIGNIFICANT CHANGE UP (ref 80–100)
MONOCYTES # BLD AUTO: 0.45 K/UL — SIGNIFICANT CHANGE UP (ref 0–0.9)
MONOCYTES NFR BLD AUTO: 6.4 % — SIGNIFICANT CHANGE UP (ref 2–14)
NEUTROPHILS # BLD AUTO: 4.31 K/UL — SIGNIFICANT CHANGE UP (ref 1.8–7.4)
NEUTROPHILS NFR BLD AUTO: 61.1 % — SIGNIFICANT CHANGE UP (ref 43–77)
NON HDL CHOLESTEROL: 64 MG/DL — SIGNIFICANT CHANGE UP
NRBC # BLD: 0 /100 WBCS — SIGNIFICANT CHANGE UP (ref 0–0)
NT-PROBNP SERPL-SCNC: <36 PG/ML — SIGNIFICANT CHANGE UP (ref 0–300)
PLATELET # BLD AUTO: 214 K/UL — SIGNIFICANT CHANGE UP (ref 150–400)
POTASSIUM SERPL-MCNC: 3.5 MMOL/L — SIGNIFICANT CHANGE UP (ref 3.5–5.3)
POTASSIUM SERPL-SCNC: 3.5 MMOL/L — SIGNIFICANT CHANGE UP (ref 3.5–5.3)
PROT SERPL-MCNC: 7 G/DL — SIGNIFICANT CHANGE UP (ref 6–8.3)
RBC # BLD: 3.54 M/UL — LOW (ref 3.8–5.2)
RBC # FLD: 11.7 % — SIGNIFICANT CHANGE UP (ref 10.3–14.5)
SODIUM SERPL-SCNC: 140 MMOL/L — SIGNIFICANT CHANGE UP (ref 135–145)
TRIGL SERPL-MCNC: 42 MG/DL — SIGNIFICANT CHANGE UP
TROPONIN T, HIGH SENSITIVITY RESULT: <6 NG/L — SIGNIFICANT CHANGE UP (ref 0–51)
TROPONIN T, HIGH SENSITIVITY RESULT: <6 NG/L — SIGNIFICANT CHANGE UP (ref 0–51)
TSH SERPL-MCNC: 2.46 UIU/ML — SIGNIFICANT CHANGE UP (ref 0.27–4.2)
WBC # BLD: 7.05 K/UL — SIGNIFICANT CHANGE UP (ref 3.8–10.5)
WBC # FLD AUTO: 7.05 K/UL — SIGNIFICANT CHANGE UP (ref 3.8–10.5)

## 2024-04-26 PROCEDURE — 96375 TX/PRO/DX INJ NEW DRUG ADDON: CPT

## 2024-04-26 PROCEDURE — 84484 ASSAY OF TROPONIN QUANT: CPT

## 2024-04-26 PROCEDURE — 96374 THER/PROPH/DIAG INJ IV PUSH: CPT | Mod: XU

## 2024-04-26 PROCEDURE — 85025 COMPLETE CBC W/AUTO DIFF WBC: CPT

## 2024-04-26 PROCEDURE — 83880 ASSAY OF NATRIURETIC PEPTIDE: CPT

## 2024-04-26 PROCEDURE — 93005 ELECTROCARDIOGRAM TRACING: CPT | Mod: XU

## 2024-04-26 PROCEDURE — 71045 X-RAY EXAM CHEST 1 VIEW: CPT

## 2024-04-26 PROCEDURE — 83036 HEMOGLOBIN GLYCOSYLATED A1C: CPT

## 2024-04-26 PROCEDURE — 80053 COMPREHEN METABOLIC PANEL: CPT

## 2024-04-26 PROCEDURE — 99236 HOSP IP/OBS SAME DATE HI 85: CPT

## 2024-04-26 PROCEDURE — 85379 FIBRIN DEGRADATION QUANT: CPT

## 2024-04-26 PROCEDURE — 96376 TX/PRO/DX INJ SAME DRUG ADON: CPT

## 2024-04-26 PROCEDURE — 97161 PT EVAL LOW COMPLEX 20 MIN: CPT

## 2024-04-26 PROCEDURE — 93971 EXTREMITY STUDY: CPT

## 2024-04-26 PROCEDURE — 80061 LIPID PANEL: CPT

## 2024-04-26 PROCEDURE — 75574 CT ANGIO HRT W/3D IMAGE: CPT | Mod: 26,MC

## 2024-04-26 PROCEDURE — 83735 ASSAY OF MAGNESIUM: CPT

## 2024-04-26 PROCEDURE — G0378: CPT

## 2024-04-26 PROCEDURE — 99285 EMERGENCY DEPT VISIT HI MDM: CPT | Mod: 25

## 2024-04-26 PROCEDURE — 75574 CT ANGIO HRT W/3D IMAGE: CPT | Mod: MC

## 2024-04-26 PROCEDURE — 83690 ASSAY OF LIPASE: CPT

## 2024-04-26 PROCEDURE — 93306 TTE W/DOPPLER COMPLETE: CPT | Mod: 26

## 2024-04-26 PROCEDURE — 84443 ASSAY THYROID STIM HORMONE: CPT

## 2024-04-26 PROCEDURE — 71045 X-RAY EXAM CHEST 1 VIEW: CPT | Mod: 26

## 2024-04-26 PROCEDURE — 93306 TTE W/DOPPLER COMPLETE: CPT

## 2024-04-26 RX ORDER — METOPROLOL TARTRATE 50 MG
50 TABLET ORAL ONCE
Refills: 0 | Status: COMPLETED | OUTPATIENT
Start: 2024-04-26 | End: 2024-04-26

## 2024-04-26 RX ORDER — FAMOTIDINE 10 MG/ML
20 INJECTION INTRAVENOUS ONCE
Refills: 0 | Status: COMPLETED | OUTPATIENT
Start: 2024-04-26 | End: 2024-04-26

## 2024-04-26 RX ORDER — LOSARTAN POTASSIUM 100 MG/1
50 TABLET, FILM COATED ORAL DAILY
Refills: 0 | Status: DISCONTINUED | OUTPATIENT
Start: 2024-04-26 | End: 2024-04-26

## 2024-04-26 RX ORDER — ACETAMINOPHEN 500 MG
975 TABLET ORAL ONCE
Refills: 0 | Status: COMPLETED | OUTPATIENT
Start: 2024-04-26 | End: 2024-04-26

## 2024-04-26 RX ORDER — AMLODIPINE BESYLATE 2.5 MG/1
10 TABLET ORAL DAILY
Refills: 0 | Status: DISCONTINUED | OUTPATIENT
Start: 2024-04-26 | End: 2024-04-29

## 2024-04-26 RX ORDER — LIDOCAINE 4 G/100G
1 CREAM TOPICAL ONCE
Refills: 0 | Status: COMPLETED | OUTPATIENT
Start: 2024-04-26 | End: 2024-04-26

## 2024-04-26 RX ORDER — ATORVASTATIN CALCIUM 80 MG/1
10 TABLET, FILM COATED ORAL AT BEDTIME
Refills: 0 | Status: DISCONTINUED | OUTPATIENT
Start: 2024-04-26 | End: 2024-04-29

## 2024-04-26 RX ORDER — KETOROLAC TROMETHAMINE 30 MG/ML
15 SYRINGE (ML) INJECTION ONCE
Refills: 0 | Status: DISCONTINUED | OUTPATIENT
Start: 2024-04-26 | End: 2024-04-26

## 2024-04-26 RX ADMIN — ATORVASTATIN CALCIUM 10 MILLIGRAM(S): 80 TABLET, FILM COATED ORAL at 03:32

## 2024-04-26 RX ADMIN — Medication 50 MILLIGRAM(S): at 10:12

## 2024-04-26 RX ADMIN — AMLODIPINE BESYLATE 10 MILLIGRAM(S): 2.5 TABLET ORAL at 05:51

## 2024-04-26 RX ADMIN — LIDOCAINE 1 PATCH: 4 CREAM TOPICAL at 08:08

## 2024-04-26 RX ADMIN — Medication 15 MILLIGRAM(S): at 08:08

## 2024-04-26 RX ADMIN — Medication 30 MILLILITER(S): at 10:12

## 2024-04-26 RX ADMIN — Medication 975 MILLIGRAM(S): at 01:15

## 2024-04-26 RX ADMIN — FAMOTIDINE 20 MILLIGRAM(S): 10 INJECTION INTRAVENOUS at 10:12

## 2024-04-26 NOTE — ED CDU PROVIDER INITIAL DAY NOTE - ATTENDING APP SHARED VISIT CONTRIBUTION OF CARE
Brian Mirza DO: I have personally performed a face to face medical and diagnostic evaluation of the patient. I have discussed with and reviewed the Resident's and/or ACP's and/or Medical/PA/NP student's note and agree with the History, ROS, Physical Exam and MDM unless otherwise indicated. A brief summary of my personal evaluation and impression can be found below.     Physical exam, HPI per prior ED Provider note    MDM as above unless otherwise specified

## 2024-04-26 NOTE — CONSULT NOTE ADULT - SUBJECTIVE AND OBJECTIVE BOX
CARDIOLOGY CONSULT - Dr. Arriaga         HPI: 64-year-old female history of HTN, HLD, GERD, Hyperthyroidism, pre-Diabetes, oophorectomy presenting to the ED for 4 days of generalized, constant chest discomfort and uneasiness, not associated with exertion.  No shortness of breath, nausea, vomiting, diaphoresis.  Notes it feels different from her indigestion.  Last stress echo was over 8 years ago. No current cardiologist. Also reports having left leg pain that radiates from buttocks down to the leg, has seen orthopedics in the past for knee pain and has been recently evaluated by physical therapy for the knee pain.  ED course: EKG NSR. Troponin <6x2. CXR clear. Duplex negative for DVT. Plan for tele monitoring and cardiology consult in CDU.      PAST MEDICAL & SURGICAL HISTORY:  HTN (Hypertension)      Hypothyroid      Acid reflux      Other specified conditions associated with female genital organs and menstrual cycle      Type 2 diabetes mellitus      No significant past surgical history        PREVIOUS DIAGNOSTIC TESTING:    [x] Echocardiogram:  < from: TTE W or WO Ultrasound Enhancing Agent (04.26.24 @ 10:10) >  CONCLUSIONS:      1. Left ventricular cavity is normal in size. Left ventricular wall thickness is normal. Left ventricular systolic function is normal with an ejection fraction of 71 % by Gupta's method of disks. There are noregional wall motion abnormalities seen.   2. Normal left ventricular diastolic function, with normal filling pressure.   3. Normal right ventricular cavity size, with normal wall thickness, and normal systolic function.   4. Normal left and right atrial size.   5. No pericardial effusion seen.   6. Estimated pulmonary artery systolic pressure is 23 mmHg.   7. Compared to the transthoracic echocardiogram performed on 11/29/2011, there have been no significant interval changes.    < end of copied text >    [ ]  Catheterization:  [ ] Stress Test:  	    MEDICATIONS:  Home Medications:  acetaminophen 325 mg oral tablet: 3 tab(s) orally every 6 hours, As needed, Mild Pain (1 - 3) (17 Aug 2019 11:40)  amLODIPine 10 mg oral tablet: 1 tab(s) orally once a day (16 Aug 2019 12:45)  hydroCHLOROthiazide 25 mg oral tablet: 1 tab(s) orally once a day (16 Aug 2019 12:45)  Klor-Con M20 oral tablet, extended release: 2 tab(s) orally once a day (16 Aug 2019 12:45)  metFORMIN 500 mg oral tablet: 1 tab(s) orally 2 times a day (16 Aug 2019 12:45)  methIMAzole 10 mg oral tablet: 1 tab(s) orally once a day (16 Aug 2019 12:45)  Motrin 600 mg oral tablet: 1 tab(s) orally every 6 hours (16 Aug 2019 19:48)  omeprazole 40 mg oral delayed release capsule: 1 cap(s) orally 2 times a day (16 Aug 2019 12:45)      MEDICATIONS  (STANDING):  amLODIPine   Tablet 10 milliGRAM(s) Oral daily  atorvastatin 10 milliGRAM(s) Oral at bedtime  hydrochlorothiazide 25 milliGRAM(s) Oral daily  methimazole 5 milliGRAM(s) Oral daily      FAMILY HISTORY:  No pertinent family history in first degree relatives        SOCIAL HISTORY:    [x] Non-smoker  [ ] Smoker  [ ] Alcohol    Allergies    Percocet 10/325 (Unknown)  oxycodone (Pruritus; Rash; Other; Nausea)  eggs (Nausea)    Intolerances    	    REVIEW OF SYSTEMS:  CONSTITUTIONAL: No fever, weight loss, or fatigue  EYES: No eye pain, visual disturbances, or discharge  ENMT:  No difficulty hearing, tinnitus, vertigo; No sinus or throat pain  NECK: No pain or stiffness  RESPIRATORY: No cough, wheezing, chills or hemoptysis; No Shortness of Breath  CARDIOVASCULAR: +chest pain, no palpitations, passing out, dizziness, or leg swelling  GASTROINTESTINAL: No abdominal or epigastric pain. No nausea, vomiting, or hematemesis; No diarrhea or constipation. No melena or hematochezia.  GENITOURINARY: No dysuria, frequency, hematuria, or incontinence  NEUROLOGICAL: No headaches, memory loss, loss of strength, numbness, or tremors  SKIN: No itching, burning, rashes, or lesions   	    [x] All others negative	  [ ] Unable to obtain    PHYSICAL EXAM:  T(C): 36.7 (04-26-24 @ 12:29), Max: 36.7 (04-26-24 @ 03:22)  HR: 78 (04-26-24 @ 12:29) (75 - 93)  BP: 138/76 (04-26-24 @ 12:29) (124/74 - 146/87)  RR: 18 (04-26-24 @ 12:29) (16 - 18)  SpO2: 98% (04-26-24 @ 12:29) (98% - 100%)  Wt(kg): --  I&O's Summary      Appearance: Normal	  Psychiatry: A & O x 3, Mood & affect appropriate  HEENT:   Normal oral mucosa, PERRL, EOMI	  Lymphatic: No lymphadenopathy  Cardiovascular: Normal S1 S2,RRR, No JVD, No murmurs  Respiratory: Lungs clear to auscultation	  Gastrointestinal:  Soft, Non-tender, + BS	  Skin: No rashes, No ecchymoses, No cyanosis	  Neurologic: Non-focal  Extremities: Normal range of motion, No clubbing, cyanosis or edema  Vascular: Peripheral pulses palpable 2+ bilaterally    TELEMETRY: 	    ECG:  SR	  RADIOLOGY:  < from: Xray Chest 1 View- PORTABLE-Urgent (04.26.24 @ 01:11) >  FINDINGS:    The lungs are clear.  No pleural effusion or pneumothorax.  Heart size is within normal limits.  No acute abnormality within visible osseous structures.      IMPRESSION:    Clear lungs.    --- End of Report ---    < end of copied text >    < from: CT Angio Heart and Coronaries w/ IV Cont (04.26.24 @ 12:17) >  IMPRESSION:    Cardiac:    Coronary artery calcium score:0. <50th percentile. No identifiable   coronary calcification.  Non-obstructive minimal coronary artery disease.  Normal LV size and thickness.  CADS-RAD 1/P1      Non Cardiac:          --- End of Report ---    < end of copied text >    OTHER: 	  	  LABS:	 	    CARDIAC MARKERS:  Troponin T, High Sensitivity Result: <6 ng/L (04-26 @ 02:59)  Troponin T, High Sensitivity Result: <6 ng/L (04-26 @ 00:08)                                  10.6   7.05  )-----------( 214      ( 26 Apr 2024 00:08 )             33.5     04-26    140  |  103  |  16  ----------------------------<  253<H>  3.5   |  27  |  0.74    Ca    9.4      26 Apr 2024 00:08  Mg     2.3     04-26    TPro  7.0  /  Alb  4.1  /  TBili  0.3  /  DBili  x   /  AST  12  /  ALT  7<L>  /  AlkPhos  82  04-26      proBNP:   Lipid Profile:   HgA1c:   TSH: Thyroid Stimulating Hormone, Serum: 2.46 uIU/mL (04-26 @ 06:25)

## 2024-04-26 NOTE — ED CDU PROVIDER DISPOSITION NOTE - CARE PROVIDER_API CALL
Naif Arriaga  Cardiovascular Disease  [address], [address]  [place] [address]  Phone: [phone]  Fax: [phone]  Follow Up Time:

## 2024-04-26 NOTE — ED CDU PROVIDER DISPOSITION NOTE - PATIENT PORTAL LINK FT
You can access the FollowMyHealth Patient Portal offered by [facility] by registering at the following website: [email or web address]. By joining [facility]’s FollowMyHealth portal, you will also be able to view your health information using other applications (apps) compatible with our system.

## 2024-04-26 NOTE — ED CDU PROVIDER DISPOSITION NOTE - NSFOLLOWUPINSTRUCTIONS_ED_ALL_ED_FT
Hydrate.      We recommend you follow up with your primary care provider within the next 2-3 days, please bring all of your results with you. You can also follow up with ***CARDS    Please return to the Emergency Department with new, worsening, or concerning symptoms, such as:  -Shortness of breath or trouble breathing  -Pressure, pain, tightness in chest  -Facial drooping, arm weakness, or speech difficulty   -Head injury or loss of consciousness   -Nonstop bleeding or an open wound     *More detailed information regarding your visit and discharge can be found by reviewing this packet Stay well-hydrated.  Continue current home medications.  Follow-up with your primary care provider in 2 to 3 days.  Bring copy of your results with you to report.  Follow-up with cardiology upon discharge.  Return to the ER for recurrent chest pain, shortness of breath or any other concerning symptoms.

## 2024-04-26 NOTE — PHYSICAL THERAPY INITIAL EVALUATION ADULT - PERTINENT HX OF CURRENT PROBLEM, REHAB EVAL
64-year-old female history of HTN, HLD, GERD, oophorectomy presenting to the ED for 3 days of generalized chest discomfort and uneasiness, not associated with exertion.  No shortness of breath, nausea, vomiting, diaphoresis.  Notes it feels different from her indigestion.  Last stress echo was over 8 years ago.  Also complaining of  left leg pain that radiates from buttocks down to the leg.  Patient had ultrasound study a year ago and is concerned today for ultrasound and would like 1.   follows with PT for leg pain. Hosp Course: 4/25 VA duplex LLE neg for DVT; 4/26 CXR neg for clear lungs

## 2024-04-26 NOTE — ED CDU PROVIDER DISPOSITION NOTE - ATTENDING APP SHARED VISIT CONTRIBUTION OF CARE
64-year-old female who has history of hypertension hyperlipidemia prediabetes and hypothyroidism who is presenting with midsternal chest pain.  Patient this morning states that she is still having persistent chest pain.  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  Patient reports that the pain is worse with movement of the arm she also is walking with a walker which is new for her.  Patient reports that she has lumbar back pain and has intermittent pain to the left lower extremity.  Patient states that this has been going on for over a week.  She has no saddle anesthesia no urinary nor fecal incontinence.  Patient reports that she has no fevers no chills.  On exam patient is awake alert oriented x 3 has clear lungs to auscultation bilaterally soft abdomen she has pain in the left leg but there is no crepitus no overlying skin changes.  She had sono of the leg yesterday that revealed no DVT patient reports DP pulses bilaterally patient with intact range of motion of the leg.  Patient is PT as well to have echo and CT coronary.

## 2024-04-26 NOTE — CONSULT NOTE ADULT - TIME BILLING
Patient seen and examined.  Agree with above ACP note.    A/p  64-year-old female history of HTN, HLD, GERD, Hyperthyroidism, pre-Diabetes, oophorectomy presenting to the ED for 4 days of generalized, constant chest discomfort and uneasiness, not associated with exertion.  No shortness of breath, nausea, vomiting, diaphoresis.    #Chest pain  -no acs  -TTE wnl   -CT cors with no obs CAD  -likely gi related, gerd  -ppi    dcp per cdu   No further cardiac w/u indicated

## 2024-04-26 NOTE — ED CDU PROVIDER INITIAL DAY NOTE - PROGRESS NOTE DETAILS
pt c/o midsternal chest pain worse with movement of the arms and deep inspiration. Patient reports that she has lumbar back pain and has intermittent pain to the left lower extremity for over a week.  She has no saddle anesthesia no urinary nor fecal incontinence.  she states she started going to physical therapy for her leg. plan for echo and cardiac ct today. will call PT for consult. pt seen with Dr. Serrano. will continue to monitor. -Brianne Arriaga PA-C Patient seen at bedside in NAD.  VSS.  Patient resting comfortably without complaints. Patient reports she is feeling well.  CT coronary initially denied minimal/mild disease.  Patient evaluated by cardiology is recommending discharge home with close outpatient follow-up.  Patient made aware of all findings and is agreeable to plan.  Strict return precautions provided. -Carter Lopez PA-C

## 2024-04-26 NOTE — CONSULT NOTE ADULT - ASSESSMENT
A/p  64-year-old female history of HTN, HLD, GERD, Hyperthyroidism, pre-Diabetes, oophorectomy presenting to the ED for 4 days of generalized, constant chest discomfort and uneasiness, not associated with exertion.  No shortness of breath, nausea, vomiting, diaphoresis.    #Chest pain  -ECG SR   -HST neg x2  -No concern for acs  -CXR clear  -Echo with nml lv fxn, no wma, no sig valvular dz  -CT cors with minimal non-obstructive CAD  -Favor cp is more likely GI in nature  -No further cardiac w/u indicated       no cv ci to d/c

## 2024-04-26 NOTE — ED CDU PROVIDER INITIAL DAY NOTE - OBSERVATION MONITORING PLAN
ED Record Reviewed Implemented All Universal Safety Interventions:  [place] to call system. Call bell, personal items and telephone within reach. Instruct patient to call for assistance. Room bathroom lighting operational. Non-slip footwear when patient is off stretcher. Physically safe environment: no spills, clutter or unnecessary equipment. Stretcher in lowest position, wheels locked, appropriate side rails in place.

## 2024-04-26 NOTE — PHYSICAL THERAPY INITIAL EVALUATION ADULT - ADDITIONAL COMMENTS
Pt reports that she lives alone in a pvt apartment with +10 JIM with a unilateral handrail and no steps to negotiate once inside. Pt states that she was ambulating independently with no AD prior to admission. Pt does not own any DME. Pt states that she was independent with all ADLs and was working as a CNA prior to admission.

## 2024-04-26 NOTE — ED ADULT NURSE REASSESSMENT NOTE - NS ED NURSE REASSESS COMMENT FT1
1541 Pt is evaluated by CDU MD Zach Lee . pt is feeling better.  Pt is discharged . Ml out  BUBBA Machado  explained the follow up care & gave the discharge summary  . Pt has stable vitals steady gait A&OX 4 at the time of Discharge
[facility] Care Plan    POC reviewed with Darin Cunha and family  Pt verbalized understanding. Questions and concerns addressed. No acute events today. Neuro status unchanged. sEEG leads intact and in place, plan to remove tomorrow, consents not obtained yet. Pt to be NPO at midnight, COVID test obtained and resulted negative. Pt refused bath. Plan to move to room 9098, Do with Epilepsy aware. Pt progressing toward goals. Will continue to monitor. See below and flowsheets for full assessment and VS info.       Neuro:  [place] Coma Scale  Best Eye Response: 4-->(E4) spontaneous  Best Motor Response: 6-->(M6) obeys commands  Best Verbal Response: 5-->(V5) oriented  [place] Coma Scale Score: 15  Assessment Qualifiers: patient not sedated/intubated, no eye obstruction present  Pupil PERRLA: yes     24 hr Temp:  [98.1 °F (36.7 °C)-98.7 °F (37.1 °C)]     CV:   Rhythm: normal sinus rhythm  BP goals:   SBP < 140  MAP > 65    Resp:   O2 Device (Oxygen Therapy): room air  Oxygen Concentration (%): 100    Plan: N/A Grid removal tomorrow 9/14    GI/:  STEPHENIE Total Score: 20  Diet/Nutrition Received: regular  Last Bowel Movement: 09/13/20  Voiding Characteristics: voids spontaneously without difficulty    Intake/Output Summary (Last 24 hours) at 9/13/2020 1720  Last data filed at 9/13/2020 1605  Gross per 24 hour   Intake 410 ml   Output 400 ml   Net 10 ml     Unmeasured Output  Urine Occurrence: 1  Stool Occurrence: 0  Pad Count: 1    Labs/Accuchecks:  Recent Labs   Lab 09/13/20  0400   WBC 7.54   RBC 4.51*   HGB 14.2   HCT 39.7*         Recent Labs   Lab 09/13/20  0400      K 3.5   CO2 24      BUN 10   CREATININE 0.8   ALKPHOS 84   ALT 20   AST 23   BILITOT 0.8      Recent Labs   Lab 09/13/20  1143   INR 1.1   APTT 29.6    No results for input(s): CPK, CPKMB, TROPONINI, MB in the last 168 hours.    Electrolytes: Electrolytes replaced  Accuchecks: none    Gtts: none       LDA/Wounds:  Lines/Drains/Airways       
Peripheral Intravenous Line                   Peripheral IV - Single Lumen 09/09/20 2134 20 G Anterior;Left Forearm 3 days         Peripheral IV - Single Lumen 09/12/20 1458 20 G Right Hand 1 day                  Wounds: No  Wound care consulted: No    
07.00 Received the Pt from  GINGER Torres Pt is Observed for CP radiating to the Rt arm  Received the Pt A&OX 4  Pt obeys commands Catalina N/V/D fever chills cp SOB   Comfort care & safety measures continued  IV site looks clean & dry no signs of infiltration noted pt denies  pain IV site .Pt is oriented to the unit Plan of care explained .  Pt is advised to call for help  call bell with in the reach pt verbalized the understanding .  pending CDU  MD chicas . GCS 15/15 A&OX 4 PERRLA  size 3 Strong upper & lower extremities steady gait  Pt is ambulatory & independent   No facial droop  No Hand Leg drop denies numbness tingling  Pt is C/O  Discomfort in the arm  & indigestion  feeling medicated as per order   Plan of care ongoing

## 2024-04-26 NOTE — ED CDU PROVIDER DISPOSITION NOTE - CLINICAL COURSE
64-year-old female history of HTN, HLD, GERD, Hyperthyroidism, pre-Diabetes, oophorectomy presenting to the ED for 4 days of generalized, constant chest discomfort and uneasiness, not associated with exertion.  No shortness of breath, nausea, vomiting, diaphoresis.  Notes it feels different from her indigestion.  Last stress echo was over 8 years ago. No current cardiologist. Also reports having left leg pain that radiates from buttocks down to the leg, has seen orthopedics in the past for knee pain and has been recently evaluated by physical therapy for the knee pain.  ED course: EKG NSR. Troponin <6x2. CXR clear. Duplex negative for DVT. Plan for tele monitoring and cardiology consult in CDU. 64-year-old female history of HTN, HLD, GERD, Hyperthyroidism, pre-Diabetes, oophorectomy presenting to the ED for 4 days of generalized, constant chest discomfort and uneasiness, not associated with exertion.  No shortness of breath, nausea, vomiting, diaphoresis.  Notes it feels different from her indigestion.  Last stress echo was over 8 years ago. No current cardiologist. Also reports having left leg pain that radiates from buttocks down to the leg, has seen orthopedics in the past for knee pain and has been recently evaluated by physical therapy for the knee pain.  ED course: EKG NSR. Troponin <6x2. CXR clear. Duplex negative for DVT. Plan for tele monitoring and cardiology consult in CDU.  In the CDU, Patient reports she is feeling well.  CT coronary initially denied minimal/mild disease.  Patient evaluated by cardiology is recommending discharge home with close outpatient follow-up.  Patient made aware of all findings and is agreeable to plan.  Strict return precautions provided

## 2024-04-26 NOTE — ED CDU PROVIDER INITIAL DAY NOTE - OBJECTIVE STATEMENT
64-year-old female history of HTN, HLD, GERD, Hyperthyroidism, pre-Diabetes, oophorectomy presenting to the ED for 4 days of generalized, constant chest discomfort and uneasiness, not associated with exertion.  No shortness of breath, nausea, vomiting, diaphoresis.  Notes it feels different from her indigestion.  Last stress echo was over 8 years ago. No current cardiologist. Also reports having left leg pain that radiates from buttocks down to the leg, has seen orthopedics in the past for knee pain and has been recently evaluated by physical therapy for the knee pain.

## 2024-04-26 NOTE — PHYSICAL THERAPY INITIAL EVALUATION ADULT - LEVEL OF INDEPENDENCE: STAIR NEGOTIATION, REHAB EVAL
Pt performed 20 standing marches at bedside with RUE support to mimic unilateral handrail at home while demonstrating adequate strength and balance to ascend stairs into home upon discharge

## 2024-06-07 RX ORDER — HYALURONATE SOD, CROSS-LINKED 30 MG/3 ML
30 SYRINGE (ML) INTRAARTICULAR
Qty: 2 | Refills: 0 | Status: ACTIVE | COMMUNITY
Start: 2024-06-07

## 2024-06-14 ENCOUNTER — APPOINTMENT (OUTPATIENT)
Dept: ORTHOPEDIC SURGERY | Facility: CLINIC | Age: 64
End: 2024-06-14

## 2024-06-18 ENCOUNTER — APPOINTMENT (OUTPATIENT)
Dept: OBGYN | Facility: CLINIC | Age: 64
End: 2024-06-18

## 2024-06-18 ENCOUNTER — APPOINTMENT (OUTPATIENT)
Dept: ORTHOPEDIC SURGERY | Facility: CLINIC | Age: 64
End: 2024-06-18
Payer: MEDICAID

## 2024-06-18 DIAGNOSIS — M17.12 UNILATERAL PRIMARY OSTEOARTHRITIS, LEFT KNEE: ICD-10-CM

## 2024-06-18 DIAGNOSIS — M17.0 BILATERAL PRIMARY OSTEOARTHRITIS OF KNEE: ICD-10-CM

## 2024-06-18 DIAGNOSIS — M17.11 UNILATERAL PRIMARY OSTEOARTHRITIS, RIGHT KNEE: ICD-10-CM

## 2024-06-18 PROCEDURE — 99214 OFFICE O/P EST MOD 30 MIN: CPT | Mod: 25

## 2024-06-18 PROCEDURE — 20610 DRAIN/INJ JOINT/BURSA W/O US: CPT | Mod: 50

## 2024-06-19 ENCOUNTER — APPOINTMENT (OUTPATIENT)
Dept: ORTHOPEDIC SURGERY | Facility: CLINIC | Age: 64
End: 2024-06-19

## 2024-06-26 ENCOUNTER — APPOINTMENT (OUTPATIENT)
Dept: ORTHOPEDIC SURGERY | Facility: CLINIC | Age: 64
End: 2024-06-26

## 2024-07-10 NOTE — ASU PREOP CHECKLIST - NS PREOP CHK HIBICLENS NA
EMG/Nerve Conduction Study Procedure Note  [address]    [address]  [place]  [address]   [phone]      Hx:    Exam:     41 y.o.  W  female   EMG  LUE   left UE paresthesia and neck pain to left shoulder.   No atrophy.  No Juanis.  No Zita.  No Lhermitte sign.  Stiffness in the neck on movement with some decreased range of motion to the left.  No atrophy.  Good reflexes.  Accompanied by daughter who corroborates..  Referring:  RAÚL Siegel     Technologist: Jim Samuel  Height: 5 foot 7 inch        Summary     needle EMG studies left upper extremity selected muscles with CV as below.                 Controlled environmental factors / EMG lab.  Temperature.   NCV : sensory segments:    Abnormal = slowing of the left median distal SCV with attenuated SNAP.  Normal left ulnar left radial SCV.  NCV transcarpal sensory segments:     Abnormal = slowing of the left transcarpal median SCV with normal transcarpal ulnar and a prolonged peak difference of 0.44 ms (UL = 0.20 ms).  NCV Motor MCV segments:   Normal left median left ulnar MCV.  F-wave studies:         Normal left median and ulnar F waves.   NEEDLE EMG:   Tested muscles::   Normal left FDI APB ADM EDC triceps deltoid biceps muscles =  Normal insertional activity and interference pattern/recruitment.  No fasciculations fibrillations positive sharp waves.  Normal MUP.  No BSS AP.  No giant MUP.  No myotonia.  No upper motor neuron sign.             INTERPRETATION:   THESE FINDINGS ARE ELECTROPHYSIOLOGIC EVIDENCE OF ENTRAPPED MEDIAN NERVE AT THE WRIST ON THE LEFT ELBOW EARLY AMOUNT AND FEATURES.  NO AXONAL DENERVATION.              CONCLUSION:      Early left carpal tunnel syndrome.  No radiculopathy.      Procedure Details:      Correlates with early left-sided carpal tunnel syndrome only.    Patient made aware.                Please Note::     Data and waveforms * filed under Procedure category [facility].    See Procedure Files for  N/A

## 2024-07-20 ENCOUNTER — EMERGENCY (EMERGENCY)
Facility: HOSPITAL | Age: 64
LOS: 1 days | Discharge: HOME | End: 2024-07-20
Attending: STUDENT IN AN ORGANIZED HEALTH CARE EDUCATION/TRAINING PROGRAM
Payer: MEDICAID

## 2024-07-20 VITALS
OXYGEN SATURATION: 97 % | SYSTOLIC BLOOD PRESSURE: 177 MMHG | TEMPERATURE: 99 F | HEART RATE: 107 BPM | WEIGHT: 229.94 LBS | RESPIRATION RATE: 17 BRPM | DIASTOLIC BLOOD PRESSURE: 91 MMHG

## 2024-07-20 LAB
ALBUMIN SERPL ELPH-MCNC: 4.2 G/DL — SIGNIFICANT CHANGE UP (ref 3.3–5)
ALP SERPL-CCNC: 84 U/L — SIGNIFICANT CHANGE UP (ref 40–120)
ALT FLD-CCNC: 12 U/L — SIGNIFICANT CHANGE UP (ref 10–45)
ANION GAP SERPL CALC-SCNC: 13 MMOL/L — SIGNIFICANT CHANGE UP (ref 5–17)
APPEARANCE UR: CLEAR — SIGNIFICANT CHANGE UP
AST SERPL-CCNC: 14 U/L — SIGNIFICANT CHANGE UP (ref 10–40)
BACTERIA # UR AUTO: NEGATIVE /HPF — SIGNIFICANT CHANGE UP
BASE EXCESS BLDV CALC-SCNC: 4.1 MMOL/L — HIGH (ref -2–3)
BASOPHILS # BLD AUTO: 0 K/UL — SIGNIFICANT CHANGE UP (ref 0–0.2)
BASOPHILS NFR BLD AUTO: 0 % — SIGNIFICANT CHANGE UP (ref 0–2)
BILIRUB SERPL-MCNC: 0.6 MG/DL — SIGNIFICANT CHANGE UP (ref 0.2–1.2)
BILIRUB UR-MCNC: NEGATIVE — SIGNIFICANT CHANGE UP
BUN SERPL-MCNC: 14 MG/DL — SIGNIFICANT CHANGE UP (ref 7–23)
CA-I SERPL-SCNC: 1.17 MMOL/L — SIGNIFICANT CHANGE UP (ref 1.15–1.33)
CALCIUM SERPL-MCNC: 9.1 MG/DL — SIGNIFICANT CHANGE UP (ref 8.4–10.5)
CAST: 0 /LPF — SIGNIFICANT CHANGE UP (ref 0–4)
CHLORIDE BLDV-SCNC: 103 MMOL/L — SIGNIFICANT CHANGE UP (ref 96–108)
CHLORIDE SERPL-SCNC: 100 MMOL/L — SIGNIFICANT CHANGE UP (ref 96–108)
CO2 BLDV-SCNC: 31 MMOL/L — HIGH (ref 22–26)
CO2 SERPL-SCNC: 25 MMOL/L — SIGNIFICANT CHANGE UP (ref 22–31)
COLOR SPEC: YELLOW — SIGNIFICANT CHANGE UP
CREAT SERPL-MCNC: 0.56 MG/DL — SIGNIFICANT CHANGE UP (ref 0.5–1.3)
DIFF PNL FLD: NEGATIVE — SIGNIFICANT CHANGE UP
EGFR: 102 ML/MIN/1.73M2 — SIGNIFICANT CHANGE UP
EOSINOPHIL # BLD AUTO: 0 K/UL — SIGNIFICANT CHANGE UP (ref 0–0.5)
EOSINOPHIL NFR BLD AUTO: 0 % — SIGNIFICANT CHANGE UP (ref 0–6)
GAS PNL BLDV: 135 MMOL/L — LOW (ref 136–145)
GAS PNL BLDV: SIGNIFICANT CHANGE UP
GAS PNL BLDV: SIGNIFICANT CHANGE UP
GLUCOSE BLDV-MCNC: 229 MG/DL — HIGH (ref 70–99)
GLUCOSE SERPL-MCNC: 224 MG/DL — HIGH (ref 70–99)
GLUCOSE UR QL: 250 MG/DL
HCO3 BLDV-SCNC: 30 MMOL/L — HIGH (ref 22–29)
HCT VFR BLD CALC: 35.7 % — SIGNIFICANT CHANGE UP (ref 34.5–45)
HCT VFR BLDA CALC: 37 % — SIGNIFICANT CHANGE UP (ref 34.5–46.5)
HGB BLD CALC-MCNC: 12.3 G/DL — SIGNIFICANT CHANGE UP (ref 11.7–16.1)
HGB BLD-MCNC: 11.4 G/DL — LOW (ref 11.5–15.5)
HOROWITZ INDEX BLDV+IHG-RTO: SIGNIFICANT CHANGE UP
KETONES UR-MCNC: 80 MG/DL
LACTATE BLDV-MCNC: 1.2 MMOL/L — SIGNIFICANT CHANGE UP (ref 0.5–2)
LEUKOCYTE ESTERASE UR-ACNC: NEGATIVE — SIGNIFICANT CHANGE UP
LIDOCAIN IGE QN: 16 U/L — SIGNIFICANT CHANGE UP (ref 7–60)
LYMPHOCYTES # BLD AUTO: 0.28 K/UL — LOW (ref 1–3.3)
LYMPHOCYTES # BLD AUTO: 3.5 % — LOW (ref 13–44)
MANUAL SMEAR VERIFICATION: SIGNIFICANT CHANGE UP
MCHC RBC-ENTMCNC: 30 PG — SIGNIFICANT CHANGE UP (ref 27–34)
MCHC RBC-ENTMCNC: 31.9 GM/DL — LOW (ref 32–36)
MCV RBC AUTO: 93.9 FL — SIGNIFICANT CHANGE UP (ref 80–100)
MONOCYTES # BLD AUTO: 0.2 K/UL — SIGNIFICANT CHANGE UP (ref 0–0.9)
MONOCYTES NFR BLD AUTO: 2.6 % — SIGNIFICANT CHANGE UP (ref 2–14)
NEUTROPHILS # BLD AUTO: 7.39 K/UL — SIGNIFICANT CHANGE UP (ref 1.8–7.4)
NEUTROPHILS NFR BLD AUTO: 93 % — HIGH (ref 43–77)
NEUTS BAND # BLD: 0.9 % — SIGNIFICANT CHANGE UP (ref 0–8)
NITRITE UR-MCNC: NEGATIVE — SIGNIFICANT CHANGE UP
OVALOCYTES BLD QL SMEAR: SLIGHT — SIGNIFICANT CHANGE UP
PCO2 BLDV: 48 MMHG — HIGH (ref 39–42)
PH BLDV: 7.4 — SIGNIFICANT CHANGE UP (ref 7.32–7.43)
PH UR: 6.5 — SIGNIFICANT CHANGE UP (ref 5–8)
PLAT MORPH BLD: NORMAL — SIGNIFICANT CHANGE UP
PLATELET # BLD AUTO: 214 K/UL — SIGNIFICANT CHANGE UP (ref 150–400)
PO2 BLDV: 31 MMHG — SIGNIFICANT CHANGE UP (ref 25–45)
POIKILOCYTOSIS BLD QL AUTO: SLIGHT — SIGNIFICANT CHANGE UP
POTASSIUM BLDV-SCNC: 3.6 MMOL/L — SIGNIFICANT CHANGE UP (ref 3.5–5.1)
POTASSIUM SERPL-MCNC: 3.5 MMOL/L — SIGNIFICANT CHANGE UP (ref 3.5–5.3)
POTASSIUM SERPL-SCNC: 3.5 MMOL/L — SIGNIFICANT CHANGE UP (ref 3.5–5.3)
PROT SERPL-MCNC: 7.2 G/DL — SIGNIFICANT CHANGE UP (ref 6–8.3)
PROT UR-MCNC: NEGATIVE MG/DL — SIGNIFICANT CHANGE UP
RBC # BLD: 3.8 M/UL — SIGNIFICANT CHANGE UP (ref 3.8–5.2)
RBC # FLD: 11.9 % — SIGNIFICANT CHANGE UP (ref 10.3–14.5)
RBC BLD AUTO: ABNORMAL
RBC CASTS # UR COMP ASSIST: 0 /HPF — SIGNIFICANT CHANGE UP (ref 0–4)
SAO2 % BLDV: 45.4 % — LOW (ref 67–88)
SODIUM SERPL-SCNC: 138 MMOL/L — SIGNIFICANT CHANGE UP (ref 135–145)
SP GR SPEC: 1.02 — SIGNIFICANT CHANGE UP (ref 1–1.03)
SQUAMOUS # UR AUTO: 0 /HPF — SIGNIFICANT CHANGE UP (ref 0–5)
UROBILINOGEN FLD QL: 1 MG/DL — SIGNIFICANT CHANGE UP (ref 0.2–1)
WBC # BLD: 7.87 K/UL — SIGNIFICANT CHANGE UP (ref 3.8–10.5)
WBC # FLD AUTO: 7.87 K/UL — SIGNIFICANT CHANGE UP (ref 3.8–10.5)
WBC UR QL: 1 /HPF — SIGNIFICANT CHANGE UP (ref 0–5)

## 2024-07-20 PROCEDURE — 84295 ASSAY OF SERUM SODIUM: CPT

## 2024-07-20 PROCEDURE — 74174 CTA ABD&PLVS W/CONTRAST: CPT | Mod: MC

## 2024-07-20 PROCEDURE — 81001 URINALYSIS AUTO W/SCOPE: CPT

## 2024-07-20 PROCEDURE — 84484 ASSAY OF TROPONIN QUANT: CPT

## 2024-07-20 PROCEDURE — 82435 ASSAY OF BLOOD CHLORIDE: CPT

## 2024-07-20 PROCEDURE — 87086 URINE CULTURE/COLONY COUNT: CPT

## 2024-07-20 PROCEDURE — 85014 HEMATOCRIT: CPT

## 2024-07-20 PROCEDURE — 85025 COMPLETE CBC W/AUTO DIFF WBC: CPT

## 2024-07-20 PROCEDURE — 80053 COMPREHEN METABOLIC PANEL: CPT

## 2024-07-20 PROCEDURE — 82947 ASSAY GLUCOSE BLOOD QUANT: CPT

## 2024-07-20 PROCEDURE — 99285 EMERGENCY DEPT VISIT HI MDM: CPT

## 2024-07-20 PROCEDURE — 82803 BLOOD GASES ANY COMBINATION: CPT

## 2024-07-20 PROCEDURE — 71275 CT ANGIOGRAPHY CHEST: CPT | Mod: MC

## 2024-07-20 PROCEDURE — 74174 CTA ABD&PLVS W/CONTRAST: CPT | Mod: 26,MC

## 2024-07-20 PROCEDURE — 83690 ASSAY OF LIPASE: CPT

## 2024-07-20 PROCEDURE — 96374 THER/PROPH/DIAG INJ IV PUSH: CPT | Mod: XU

## 2024-07-20 PROCEDURE — 82330 ASSAY OF CALCIUM: CPT

## 2024-07-20 PROCEDURE — 83605 ASSAY OF LACTIC ACID: CPT

## 2024-07-20 PROCEDURE — 84132 ASSAY OF SERUM POTASSIUM: CPT

## 2024-07-20 PROCEDURE — 71275 CT ANGIOGRAPHY CHEST: CPT | Mod: 26,MC

## 2024-07-20 PROCEDURE — 85018 HEMOGLOBIN: CPT

## 2024-07-20 PROCEDURE — 96375 TX/PRO/DX INJ NEW DRUG ADDON: CPT | Mod: XU

## 2024-07-20 PROCEDURE — 36415 COLL VENOUS BLD VENIPUNCTURE: CPT

## 2024-07-20 PROCEDURE — 99284 EMERGENCY DEPT VISIT MOD MDM: CPT | Mod: 25

## 2024-07-20 RX ORDER — ONDANSETRON HYDROCHLORIDE 2 MG/ML
4 INJECTION INTRAMUSCULAR; INTRAVENOUS ONCE
Refills: 0 | Status: COMPLETED | OUTPATIENT
Start: 2024-07-20 | End: 2024-07-20

## 2024-07-20 RX ORDER — SODIUM CHLORIDE 0.9 % (FLUSH) 0.9 %
1000 SYRINGE (ML) INJECTION ONCE
Refills: 0 | Status: COMPLETED | OUTPATIENT
Start: 2024-07-20 | End: 2024-07-20

## 2024-07-20 RX ORDER — ACETAMINOPHEN 325 MG
1000 TABLET ORAL ONCE
Refills: 0 | Status: COMPLETED | OUTPATIENT
Start: 2024-07-20 | End: 2024-07-20

## 2024-07-20 RX ADMIN — Medication 400 MILLIGRAM(S): at 18:41

## 2024-07-20 RX ADMIN — Medication 1000 MILLILITER(S): at 18:41

## 2024-07-20 RX ADMIN — ONDANSETRON HYDROCHLORIDE 4 MILLIGRAM(S): 2 INJECTION INTRAMUSCULAR; INTRAVENOUS at 18:41

## 2024-07-20 NOTE — ED ADULT NURSE NOTE - NSFALLUNIVINTERV_ED_ALL_ED
Bed/Stretcher in lowest position, wheels locked, appropriate side rails in place/Call bell, personal items and telephone in reach/Instruct patient to call for assistance before getting out of bed/chair/stretcher/Non-slip footwear applied when patient is off stretcher/[place] to call system/Physically safe environment - no spills, clutter or unnecessary equipment/Purposeful proactive rounding/Room/bathroom lighting operational, light cord in reach

## 2024-07-20 NOTE — ED PROVIDER NOTE - ATTENDING CONTRIBUTION TO CARE
Schwamb ATTG   64F HTN HLD CC abd pain, pt notes she has been having nausea / abdominal pain since this morning, [facility] come to [facility]  pt states that she is also having interment left foot numbness but ongoing for months,  given hx and pe very low concern for dissection, possible appy pt is tender to in RLQ mildly, will obtain labs imaging, otherwise on PE agree with resident above   if williamson is benign will rec outpt GI fu w/ symptomatic GI control   Schwamb ATTG See MDM I performed a history and physical exam of the patient and discussed their management with the resident reviewed the resident note and agree with the documented findings and plan of care. My medical decision making and observations are found above.

## 2024-07-20 NOTE — ED PROVIDER NOTE - PROGRESS NOTE DETAILS
Tyrone PGY2:  CT negative for dissection and PE.  notes possible pulmonary htn.  appendix unable to be visualized.  had shared decision making with patient and family with patient regarding results and disposition.  patient feeling well at this time and tolerating PO.  labwork unremarkable.  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  Patient and family would like to go home at this time and follow up outpatient.  will d/c with rx for zofran, strict return precautions, follow up to pcp [place]one PGY2: received patient at signout.  63 yo F, here for RLQ pain with vomiting.  sent for urgent care.  also c/o chronic numbness to RUE and LLE.  pending CTa chest, abd/pelvis assess for dissection, appendicitis.

## 2024-07-20 NOTE — ED ADULT TRIAGE NOTE - WEIGHT METHOD
Face to face shift report received from Day KENT RN. Rounding completed, pt observed in room appears to be sleeping, in no apparent distress.  Respirations are even and non labored.  15 min/prn safety checks continue.        Problem: Behavioral Health Plan of Care  Goal: Patient-Specific Goal (Individualization)  Description: Pt will sleep at least 6-8 hours per NOC shift.  Pt will eat at least 50% of meals.  Pt will be medication compliant.  Pt will follow treatment team recommendations,  Pt will attend at least 50% of groups.  Pt will get his own meals trays.  Pt can use phone in lounge during group times due to paranoia.    Outcome: Improving    Pt appeared to sleep approx. 7 hours during the night.      Face to face report will be communicated to oncoming RN.    Ema Dwyer RN  8/28/2021  5:45 AM    stated

## 2024-07-20 NOTE — ED PROVIDER NOTE - NS ED ATTENDING STATEMENT MOD
I have seen and examined this patient and fully participated in the care of this patient as the teaching attending.  The service was shared with the BARBARA.  I reviewed and verified the documentation.

## 2024-07-20 NOTE — ED PROVIDER NOTE - PHYSICAL EXAMINATION
Const: Awake, alert, no acute distress.  Well appearing.  Moving comfortably on stretcher.  HEENT: NC/AT.  Moist mucous membranes.  No pharyngeal erythema, no exudates.  Eyes: Extraocular movements intact b/l.  Conjunctiva pink.  No scleral icterus.  Neck: Neck supple, full ROM without pain.  Cardiac: Regular rate and regular rhythm. S1 S2 present.  Peripheral pulses 2+ and symmetric. No LE edema.  Resp: Speaking in full sentences. No evidence of respiratory distress.  Breath sounds clear to auscultation b/l. Normal chest excursion.   Abd: Periumbilical ttp. Non-distended, no overlying skin changes.  Soft, no guarding, no rigidity, no rebound tenderness.  No palpable masses.  Normal bowel sounds in all 4 quadrants.  Skin: Normal coloration.  No rashes, abrasions or lacerations.  Neuro: Awake, alert & oriented x 3.  Moves all extremities spontaneously.  No focal deficits.

## 2024-07-20 NOTE — ED PROVIDER NOTE - PATIENT PORTAL LINK FT
You can access the FollowMyHealth Patient Portal offered by [facility] by registering at the following website: [email or web address]. By joining [employer]’s FollowMyHealth portal, you will also be able to view your health information using other applications (apps) compatible with our system.

## 2024-07-20 NOTE — ED PROVIDER NOTE - NSFOLLOWUPINSTRUCTIONS_ED_ALL_ED_FT
You were seen in the Emergency Department for abdominal pain.  Your evaluation today was unclear as to the specific cause your symptoms.  We have ruled out dangerous causes of your symptoms such as pulmonary embolism and aortic dissection.  Your CT scans today were unable to visualize your appendix so we could not rule out an appendicitis.  Based on our discussion with your and your family in the department we have decided to send you home with strict return precautions for any worsening symptoms including worsening pain, fever, intractable vomiting.  At home, you can take acetaminophen and/or ibuprofen as needed for pain.  Drink plenty of fluids.      1) Continue all previously prescribed medications as directed.    2) Follow up with your primary care physician - take copies of your results.    3) Return to the Emergency Department for worsening or persistent symptoms, and/or ANY NEW OR CONCERNING SYMPTOMS.

## 2024-07-20 NOTE — ED PROVIDER NOTE - CLINICAL SUMMARY MEDICAL DECISION MAKING FREE TEXT BOX
64-year-old female history of HTN, HLD, GERD, oophorectomy presents to the ED with abdominal pain since 9 AM this morning associated with NBNB emesis.  Patient patient presented from urgent care due to concern for appendicitis.  Patient points to umbilicus when localizing the pain.  Patient also endorses left lower extremity numbness, and right upper extremity weakness that has been going on for the past few weeks.  Patient says she is unable to close her right fist.  Patient denies chest pain, shortness of breath, palpitations, fever, chills, diarrhea, constipation.  DDx includes appendicitis, ACS.  Rule out dissection.  Dispo pending labs, imaging and reassessment.

## 2024-07-20 NOTE — ED ADULT NURSE NOTE - OBJECTIVE STATEMENT
Patient sent from  for periumbilical pain, n/v since 9am today. Patient was sent to r/o appendicitis. Patient reports multiple episodes of vomiting since this morning, she received zofran ODT at  but still feels nauseous. Patient denies urinary symptoms, fever, chills. Patient A&Ox4, ambulatory. No signs of acute distress at this time. Plan of care in progress.

## 2024-07-21 VITALS
TEMPERATURE: 99 F | OXYGEN SATURATION: 96 % | SYSTOLIC BLOOD PRESSURE: 123 MMHG | DIASTOLIC BLOOD PRESSURE: 69 MMHG | HEART RATE: 86 BPM | RESPIRATION RATE: 18 BRPM

## 2024-07-21 RX ORDER — ONDANSETRON HYDROCHLORIDE 2 MG/ML
1 INJECTION INTRAMUSCULAR; INTRAVENOUS
Qty: 1 | Refills: 0
Start: 2024-07-21 | End: 2024-07-23

## 2024-07-22 ENCOUNTER — APPOINTMENT (OUTPATIENT)
Dept: ORTHOPEDIC SURGERY | Facility: CLINIC | Age: 64
End: 2024-07-22

## 2024-07-22 LAB
CULTURE RESULTS: SIGNIFICANT CHANGE UP
SPECIMEN SOURCE: SIGNIFICANT CHANGE UP

## 2024-07-22 RX ORDER — ONDANSETRON HYDROCHLORIDE 2 MG/ML
1 INJECTION INTRAMUSCULAR; INTRAVENOUS
Qty: 1 | Refills: 0
Start: 2024-07-22 | End: 2024-07-24

## 2024-07-24 ENCOUNTER — APPOINTMENT (OUTPATIENT)
Dept: OBGYN | Facility: CLINIC | Age: 64
End: 2024-07-24
Payer: MEDICAID

## 2024-07-24 ENCOUNTER — APPOINTMENT (OUTPATIENT)
Dept: OBGYN | Facility: CLINIC | Age: 64
End: 2024-07-24

## 2024-07-24 VITALS
HEART RATE: 84 BPM | SYSTOLIC BLOOD PRESSURE: 156 MMHG | HEIGHT: 68 IN | WEIGHT: 236 LBS | DIASTOLIC BLOOD PRESSURE: 83 MMHG | BODY MASS INDEX: 35.77 KG/M2

## 2024-07-24 DIAGNOSIS — R92.30 DENSE BREASTS, UNSPECIFIED: ICD-10-CM

## 2024-07-24 DIAGNOSIS — Z01.411 ENCOUNTER FOR GYNECOLOGICAL EXAMINATION (GENERAL) (ROUTINE) WITH ABNORMAL FINDINGS: ICD-10-CM

## 2024-07-24 DIAGNOSIS — R10.84 GENERALIZED ABDOMINAL PAIN: ICD-10-CM

## 2024-07-24 DIAGNOSIS — N39.3 STRESS INCONTINENCE (FEMALE) (MALE): ICD-10-CM

## 2024-07-24 DIAGNOSIS — R10.9 UNSPECIFIED ABDOMINAL PAIN: ICD-10-CM

## 2024-07-24 PROCEDURE — 99396 PREV VISIT EST AGE 40-64: CPT

## 2024-07-24 PROCEDURE — 99459 PELVIC EXAMINATION: CPT

## 2024-07-25 LAB
APPEARANCE: CLEAR
BACTERIA: NEGATIVE /HPF
BILIRUBIN URINE: NEGATIVE
BLOOD URINE: NEGATIVE
CAST: 0 /LPF
COLOR: YELLOW
EPITHELIAL CELLS: 0 /HPF
GLUCOSE QUALITATIVE U: NEGATIVE MG/DL
HPV HIGH+LOW RISK DNA PNL CVX: NOT DETECTED
KETONES URINE: NEGATIVE MG/DL
LEUKOCYTE ESTERASE URINE: NEGATIVE
MICROSCOPIC-UA: NORMAL
NITRITE URINE: NEGATIVE
PH URINE: 7.5
PROTEIN URINE: NEGATIVE MG/DL
RED BLOOD CELLS URINE: 1 /HPF
SPECIFIC GRAVITY URINE: 1.01
UROBILINOGEN URINE: 2 MG/DL
WHITE BLOOD CELLS URINE: 0 /HPF

## 2024-07-26 LAB — BACTERIA UR CULT: NORMAL

## 2024-07-27 NOTE — PLAN
[FreeTextEntry1] : ALIVIA is a 64 year old female presenting for well woman exam.   HCM: - Referrals for mammo/sono given today. - Pap/HPV done today.  Abdominal pain and reflux: - Referral given for gastroenterologist.  Stress incontinence and pelvic organ prolapse:  - Urine culture and anaylsis - Referral given for Urogynecologist    RTO 1 year for annual gyn exam.

## 2024-07-27 NOTE — PHYSICAL EXAM
[Chaperone Present] : A chaperone was present in the examining room during all aspects of the physical examination [Appropriately responsive] : appropriately responsive [Alert] : alert [No Acute Distress] : no acute distress [No Lymphadenopathy] : no lymphadenopathy [Regular Rate Rhythm] : regular rate rhythm [No Murmurs] : no murmurs [Clear to Auscultation B/L] : clear to auscultation bilaterally [Soft] : soft [Non-tender] : non-tender [Non-distended] : non-distended [No HSM] : No HSM [No Lesions] : no lesions [No Mass] : no mass [Oriented x3] : oriented x3 [Examination Of The Breasts] : a normal appearance [No Masses] : no breast masses were palpable [Labia Majora] : normal [Labia Minora] : normal [Cystocele] : a cystocele [Normal] : normal [Uterine Adnexae] : normal [54948] : A chaperone was present during the pelvic exam. [FreeTextEntry2] : MA

## 2024-07-27 NOTE — HISTORY OF PRESENT ILLNESS
[FreeTextEntry1] : 64 year old postmenopausal female presents for annual gyn visit. Pt went to the ER last week due to abdominal pain, and nausea/vomiting after eating. Pt had CT angiogram noting no acute findings in abdomen or pelvis. Blood work was normal and urine test was negative.   Pt c/o sharp pelvic pain when she gets up during the night to urinate. Pt reports a little urine leakage when she coughs or sneezes.  She denies PMB and abnormal discharge. Pt has been on omeprazole for many years.      [Mammogramdate] : 07/21 [BreastSonogramDate] : 07/21 [PapSmeardate] : 09/22

## 2024-07-27 NOTE — DISCUSSION/SUMMARY
[FreeTextEntry1] :  This note was written by Maria G Bowles on 07/24/2024 actively solely Dr. Gerber Huffman M.D.   All medical record entries made by this scribe at my, Dr. Gerber Huffman M.D. direction and personally dictated by me on 07/24/2024. I have personally reviewed the chart and agree that the record reflects my personal performance of the history, physical exam, assessment, and plan.

## 2024-07-27 NOTE — PHYSICAL EXAM
[Chaperone Present] : A chaperone was present in the examining room during all aspects of the physical examination [Appropriately responsive] : appropriately responsive [Alert] : alert [No Acute Distress] : no acute distress [No Lymphadenopathy] : no lymphadenopathy [Regular Rate Rhythm] : regular rate rhythm [No Murmurs] : no murmurs [Clear to Auscultation B/L] : clear to auscultation bilaterally [Soft] : soft [Non-tender] : non-tender [Non-distended] : non-distended [No HSM] : No HSM [No Lesions] : no lesions [No Mass] : no mass [Oriented x3] : oriented x3 [Examination Of The Breasts] : a normal appearance [No Masses] : no breast masses were palpable [Labia Majora] : normal [Labia Minora] : normal [Cystocele] : a cystocele [Normal] : normal [Uterine Adnexae] : normal [97249] : A chaperone was present during the pelvic exam. [FreeTextEntry2] : MA

## 2024-07-29 LAB — CYTOLOGY CVX/VAG DOC THIN PREP: NORMAL

## 2024-08-21 ENCOUNTER — APPOINTMENT (OUTPATIENT)
Dept: ORTHOPEDIC SURGERY | Facility: CLINIC | Age: 64
End: 2024-08-21
Payer: MEDICAID

## 2024-08-21 VITALS — BODY MASS INDEX: 35.77 KG/M2 | HEIGHT: 68 IN | WEIGHT: 236 LBS

## 2024-08-21 DIAGNOSIS — M19.071 PRIMARY OSTEOARTHRITIS, RIGHT ANKLE AND FOOT: ICD-10-CM

## 2024-08-21 DIAGNOSIS — M19.072 PRIMARY OSTEOARTHRITIS, RIGHT ANKLE AND FOOT: ICD-10-CM

## 2024-08-21 DIAGNOSIS — M24.172 OTHER ARTICULAR CARTILAGE DISORDERS, LEFT ANKLE: ICD-10-CM

## 2024-08-21 DIAGNOSIS — M21.42 FLAT FOOT [PES PLANUS] (ACQUIRED), RIGHT FOOT: ICD-10-CM

## 2024-08-21 DIAGNOSIS — M94.279 CHONDROMALACIA, UNSPECIFIED ANKLE AND JOINTS OF FOOT: ICD-10-CM

## 2024-08-21 DIAGNOSIS — M76.822 POSTERIOR TIBIAL TENDINITIS, LEFT LEG: ICD-10-CM

## 2024-08-21 DIAGNOSIS — M21.41 FLAT FOOT [PES PLANUS] (ACQUIRED), RIGHT FOOT: ICD-10-CM

## 2024-08-21 PROCEDURE — 99213 OFFICE O/P EST LOW 20 MIN: CPT

## 2024-08-21 RX ORDER — MELOXICAM 7.5 MG/1
7.5 TABLET ORAL
Qty: 30 | Refills: 1 | Status: ACTIVE | COMMUNITY
Start: 2024-08-21 | End: 1900-01-01

## 2024-08-24 NOTE — DISCUSSION/SUMMARY
[de-identified] : Options reviewed, NB shoe wear, [place] brace custom orthotic L foot / ankle, activity modification, physical therapy / rehabilitation and associated modalities, calf stretching exercises and HEP, MRI assessment L ankle.  Return to office in 3 - 4 weeks / PRN, advanced imaging study review.  All questions answered.

## 2024-08-24 NOTE — DISCUSSION/SUMMARY
[de-identified] : Options reviewed, NB shoe wear, [place] brace custom orthotic L foot / ankle, activity modification, physical therapy / rehabilitation and associated modalities, calf stretching exercises and HEP, MRI assessment L ankle.  Return to office in 3 - 4 weeks / PRN, advanced imaging study review.  All questions answered.

## 2024-08-24 NOTE — PHYSICAL EXAM
[de-identified] : Radiographs (3v B feet and 2v B ankles) reveal PTS B feet, Reyna's B feet, posterior and plantar calcaneal enthesophyte B hindfoot with calcification B distal Achilles region, arthrosis TN B hindfoot (L > R), dorsal spurring R midfoot, early crossover deformity second toe B forefoot, subchondral cysts / degenerative changes R ankle. [Normal] : Oriented to person, place, and time, insight and judgement were intact and the affect was normal [de-identified] : MARGO, NAD [de-identified] : Extremity: +equinus (releases) B LE, +PPAV B feet, unable to perform B SLHR testing, interval decreased soft tissue swelling and decreased tenderness L PTT insertional, residual tenderness TN joints B hindfoot  (L > R), +lateral impaction L hindfoot.  Nontender B Achilles, peroneals, syndesmosis, midfoot LF and forefoot.  Stable Drawer testing B ankles, 5 / 5 evertor strength B ankles, calves soft and nontender, sensorimotor and skin as aforementioned B LE.  AOx3, mood / affect normal.

## 2024-08-24 NOTE — HISTORY OF PRESENT ILLNESS
[Other: ____] : [unfilled] [FreeTextEntry1] : 64 year female presents for evaluation of b/l ankles pain/swelling/pes planus (L>R) x 1 yr. Pt denies any recent injuries to b/l foot/ankles. Pt states the pain is on medial/lateral aspect of b/l ankles. [place] brace has been helpful. Received steroid injection in her TN joint June 2023 by Dr. Samson which provided patient with relief for 3 months.  Denies additional musculoskeletal complaints referable to foot/ankle. PMHx remarkable for pre-DM.

## 2024-08-24 NOTE — PHYSICAL EXAM
[de-identified] : Radiographs (3v B feet and 2v B ankles) reveal PTS B feet, Reyna's B feet, posterior and plantar calcaneal enthesophyte B hindfoot with calcification B distal Achilles region, arthrosis TN B hindfoot (L > R), dorsal spurring R midfoot, early crossover deformity second toe B forefoot, subchondral cysts / degenerative changes R ankle. [Normal] : Oriented to person, place, and time, insight and judgement were intact and the affect was normal [de-identified] : MARGO, NAD [de-identified] : Extremity: +equinus (releases) B LE, +PPAV B feet, unable to perform B SLHR testing, interval decreased soft tissue swelling and decreased tenderness L PTT insertional, residual tenderness TN joints B hindfoot  (L > R), +lateral impaction L hindfoot.  Nontender B Achilles, peroneals, syndesmosis, midfoot LF and forefoot.  Stable Drawer testing B ankles, 5 / 5 evertor strength B ankles, calves soft and nontender, sensorimotor and skin as aforementioned B LE.  AOx3, mood / affect normal.

## 2024-09-05 ENCOUNTER — APPOINTMENT (OUTPATIENT)
Dept: GASTROENTEROLOGY | Facility: CLINIC | Age: 64
End: 2024-09-05
Payer: COMMERCIAL

## 2024-09-05 VITALS
OXYGEN SATURATION: 97 % | HEART RATE: 84 BPM | BODY MASS INDEX: 36.22 KG/M2 | SYSTOLIC BLOOD PRESSURE: 159 MMHG | DIASTOLIC BLOOD PRESSURE: 80 MMHG | TEMPERATURE: 98.4 F | WEIGHT: 239 LBS | HEIGHT: 68 IN

## 2024-09-05 DIAGNOSIS — Z12.12 ENCOUNTER FOR SCREENING FOR MALIGNANT NEOPLASM OF COLON: ICD-10-CM

## 2024-09-05 DIAGNOSIS — Z12.11 ENCOUNTER FOR SCREENING FOR MALIGNANT NEOPLASM OF COLON: ICD-10-CM

## 2024-09-05 DIAGNOSIS — K21.9 GASTRO-ESOPHAGEAL REFLUX DISEASE W/OUT ESOPHAGITIS: ICD-10-CM

## 2024-09-05 PROCEDURE — 99204 OFFICE O/P NEW MOD 45 MIN: CPT

## 2024-09-05 RX ORDER — FAMOTIDINE 20 MG/1
20 TABLET, FILM COATED ORAL TWICE DAILY
Qty: 180 | Refills: 3 | Status: ACTIVE | COMMUNITY
Start: 2024-09-05 | End: 1900-01-01

## 2024-09-05 RX ORDER — PROPRANOLOL HYDROCHLORIDE 10 MG/1
10 TABLET ORAL
Refills: 0 | Status: ACTIVE | COMMUNITY

## 2024-09-05 RX ORDER — SODIUM SULFATE, POTASSIUM SULFATE AND MAGNESIUM SULFATE 1.6; 3.13; 17.5 G/177ML; G/177ML; G/177ML
17.5-3.13-1.6 SOLUTION ORAL
Qty: 2 | Refills: 0 | Status: ACTIVE | COMMUNITY
Start: 2024-09-05 | End: 1900-01-01

## 2024-09-05 NOTE — PHYSICAL EXAM
[Alert] : alert [Normal Voice/Communication] : normal voice/communication [Healthy Appearing] : healthy appearing [No Acute Distress] : no acute distress [Sclera] : the sclera and conjunctiva were normal [Hearing Threshold Finger Rub Not [place]] : hearing was normal [Normal Lips/Gums] : the lips and gums were normal [Oropharynx] : the oropharynx was normal [Normal Appearance] : the appearance of the neck was normal [No Neck Mass] : no neck mass was observed [No Respiratory Distress] : no respiratory distress [No Acc Muscle Use] : no accessory muscle use [Respiration, Rhythm And Depth] : normal respiratory rhythm and effort [Auscultation Breath Sounds / Voice Sounds] : lungs were clear to auscultation bilaterally [Heart Rate And Rhythm] : heart rate was normal and rhythm regular [Normal S1, S2] : normal S1 and S2 [Murmurs] : no murmurs [None] : no edema [Bowel Sounds] : normal bowel sounds [Abdomen Tenderness] : non-tender [No Masses] : no abdominal mass palpated [Abdomen Soft] : soft [] : no hepatosplenomegaly [Supraclavicular Lymph Nodes Enlarged Bilaterally] : no supraclavicular lymphadenopathy [Cervical Lymph Nodes Enlarged Posterior Bilaterally] : no posterior cervical lymphadenopathy [No CVA Tenderness] : no CVA  tenderness [Abnormal Walk] : normal gait [Motor Exam] : the motor exam was normal [Normal] : oriented to person, place, and time [Oriented To Time, Place, And Person] : oriented to person, place, and time [Normal Affect] : the affect was normal [Normal Mood] : the mood was normal

## 2024-09-05 NOTE — HISTORY OF PRESENT ILLNESS
[FreeTextEntry1] : 64-year-old diabetic woman with a long history of GERD which she had been on omeprazole 40 mg daily.  Recently however the medication appears not to be working and she complains of abdominal bloating and nausea.  Approximate month ago she was in the [facility] emergency room where she underwent a CT angio of the abdomen and chest both of which were unremarkable.  She denies rectal bleeding, melena or hematemesis her last colonoscopy was over 5 years ago and is not available.

## 2024-09-05 NOTE — ASSESSMENT
[FreeTextEntry1] : GERD. Dietary and lifestyle modification discussed with the patient.  Start famotidine 20 mg twice daily.  EGD and biopsy to be scheduled. Screening colonoscopy.  The importance of colon cancer screening and the colonoscopy prep was discussed with the patient.  She understands all questions were answered.

## 2024-09-05 NOTE — CONSULT LETTER
[Dear  ___] : Dear  [unfilled], [Consult Letter:] : I had the pleasure of evaluating your patient, [unfilled]. [Please see my note below.] : Please see my note below. [Consult Closing:] : Thank you very much for allowing me to participate in the care of this patient.  If you have any questions, please do not hesitate to contact me. [Sincerely,] : Sincerely, [FreeTextEntry3] : Luis Fernando Foreman MD, FACG

## 2024-09-23 ENCOUNTER — APPOINTMENT (OUTPATIENT)
Dept: ORTHOPEDIC SURGERY | Facility: CLINIC | Age: 64
End: 2024-09-23

## 2024-09-29 ENCOUNTER — EMERGENCY (EMERGENCY)
Facility: HOSPITAL | Age: 64
LOS: 1 days | Discharge: HOME | End: 2024-09-29
Attending: EMERGENCY MEDICINE
Payer: MEDICAID

## 2024-09-29 VITALS
OXYGEN SATURATION: 99 % | SYSTOLIC BLOOD PRESSURE: 180 MMHG | HEART RATE: 84 BPM | WEIGHT: 229.94 LBS | HEIGHT: 68 IN | RESPIRATION RATE: 16 BRPM | TEMPERATURE: 98 F | DIASTOLIC BLOOD PRESSURE: 84 MMHG

## 2024-09-29 VITALS
SYSTOLIC BLOOD PRESSURE: 136 MMHG | RESPIRATION RATE: 16 BRPM | TEMPERATURE: 99 F | HEART RATE: 65 BPM | DIASTOLIC BLOOD PRESSURE: 86 MMHG | OXYGEN SATURATION: 98 %

## 2024-09-29 PROCEDURE — 73562 X-RAY EXAM OF KNEE 3: CPT | Mod: 26,LT

## 2024-09-29 PROCEDURE — 99284 EMERGENCY DEPT VISIT MOD MDM: CPT

## 2024-09-29 PROCEDURE — 99283 EMERGENCY DEPT VISIT LOW MDM: CPT | Mod: 25

## 2024-09-29 PROCEDURE — 73562 X-RAY EXAM OF KNEE 3: CPT

## 2024-09-29 RX ORDER — IBUPROFEN 600 MG
600 TABLET ORAL ONCE
Refills: 0 | Status: COMPLETED | OUTPATIENT
Start: 2024-09-29 | End: 2024-09-29

## 2024-09-29 RX ORDER — ACETAMINOPHEN 325 MG/1
975 TABLET ORAL ONCE
Refills: 0 | Status: COMPLETED | OUTPATIENT
Start: 2024-09-29 | End: 2024-09-29

## 2024-09-29 RX ORDER — MELOXICAM 15 MG/1
1 TABLET ORAL
Qty: 7 | Refills: 0
Start: 2024-09-29 | End: 2024-10-05

## 2024-09-29 RX ADMIN — ACETAMINOPHEN 975 MILLIGRAM(S): 325 TABLET ORAL at 09:31

## 2024-09-29 RX ADMIN — Medication 600 MILLIGRAM(S): at 09:32

## 2024-09-29 NOTE — ED PROVIDER NOTE - CARE PROVIDER_API CALL
Cruz Prestno  Orthopaedic Surgery  [address], [address]  [place] [address]  Phone: [phone]  Fax: [phone]  Follow Up Time: 4-6 Days

## 2024-09-29 NOTE — ED PROVIDER NOTE - NSPTACCESSSVCSAPPTDETAILS_ED_ALL_ED_FT
Orthopedic follow up for severe osteoarthritis of left knee Orthopedic follow up for severe osteoarthritis of left knee    ALSO Rheumatology for joint pain

## 2024-09-29 NOTE — ED ADULT NURSE NOTE - OBJECTIVE STATEMENT
64y Female complaining of  left knee pain swelling right shoulder pain numbness x 2 days denies trauma

## 2024-09-29 NOTE — ED PROVIDER NOTE - PATIENT PORTAL LINK FT
You can access the FollowMyHealth Patient Portal offered by [facility] by registering at the following website: [email or web address]. By joining [employer]’s FollowMyHealth portal, you will also be able to view your health information using other applications (apps) compatible with our system.

## 2024-09-29 NOTE — ED PROVIDER NOTE - LOWER EXTREMITY EXAM, LEFT
Mild swelling to left knee over medial joint line region with overlying tenderness.  No overlying skin deformities noted.  Varicose veins noted to medial proximal shin.  Mild tenderness to bilateral joint lines of knee and pans anserine bursa region. Able to range leg at knee. No bony hip joint or ankle ttp. full AROM all joints with 5/5 strength though pain w/ full knee flexion. No calf ttp. Compartments soft/compressible. Sensation intact. Distal pulses full/equal b/l.

## 2024-09-29 NOTE — ED PROVIDER NOTE - CLINICAL SUMMARY MEDICAL DECISION MAKING FREE TEXT BOX
Attending note.  Patient was seen in room #21 to the left.  Patient complaining of severe anterior medial knee pain for the last month which became worse the last 2 days.  Patient was seen by vascular specialist on Friday who recommended possible injections for the patient's but needs authorization.  Patient has history of osteoarthritis in the left knee and received viscosupplementation by orthopedist in July.  She denies any trauma or injury.  Patient works as a CNA.  She has past medical history of prediabetes, hypertension and hypothyroidism.  She not take any medication for her knee today.  Pain is worse with ambulation and weightbearing.  She occasionally gets leg swelling and knee swelling.  She currently denies any back pain or numbness or paresthesia.      ROS-as above, otherwise negative.  PE-patient is alert in moderate to severe distress secondary to pain.  Examination of the left lower extremity reveals mild knee swelling.  There is no knee effusion.  Patient has tenderness over the anterior medial knee inferior to the joint line.  Patient has small varicose veins.  Patient is able to flex her knee to approximately 90 degrees.  There is no calf tenderness or medial thigh tenderness.  Sensation is intact in all.  There is no erythema or rash.  Distal pulses are intact.     A/P-patient with 1 month of anterior medial left knee pain which became worse 2 days ago.  She has a history of OA and previously received injections.  She was also seen by vascular surgeon who is recommending injections.  No suspicion for DVT.  Possibly related to osteoarthritis versus pes anserine bursitis.  X-ray knee, Tylenol, ibuprofen and follow-up with orthopedics.

## 2024-09-29 NOTE — ED PROVIDER NOTE - PROGRESS NOTE DETAILS
X-ray shows severe osteoarthritis patient ambulatory in ED unassisted though with slight antalgic gait. Patient has outpatient ortho f/u for gel injections, likely needs discussion with them about eventual knee replacement. I counseled results and need for continued outpatient orthopedic follow-up regarding she has had difficulty arranging an outpatient orthopedic appointment recently, therefore will give referral to see if can expedite follow-up.  Additionally will give meloxicam prescription for anti-inflammatory effect.  Patient counseled on usage, potential for elevated BP with usage and to monitor as well as discontinuation if she notices any send aware blood in stool and to seek medical care immediately afterwards.  All questions answered, stable for discharge. - Shadi Berman PA-C

## 2024-09-29 NOTE — ED PROVIDER NOTE - NSFOLLOWUPINSTRUCTIONS_ED_ALL_ED_FT
Follow up with your orthopedist Dr. Preston within the next 1 week. We are placing a referral via our referral coordinator to help assist. You will receive a call in the next several days to discuss this.     Rest, ice and elevate knee throughout the day to help decrease inflammation. You may use ACE wrap as compression to alleviate symptoms.     For pain, take Tylenol (3 regular strength 325 mg tabs or 2 Extra strength 500 mg tablets) every 8 hours as needed.    You are being prescribed an anti-inflammatory medicine called to be taken once daily.  This medication is a NSAID class medication, therefore do not take any additional NSAIDs while on this med, this includes but is not limited to, ibuprofen, Motrin, Aleve, Advil, Naprosyn.  Please read medication side effects and warnings on the label.    Return the Emergency Department immediately if develop any new/worsening symptoms including but not limited to increasing pain, redness/swelling over the knee, fever, inability to bend the knee or any other concerns.

## 2024-09-29 NOTE — ED PROVIDER NOTE - OBJECTIVE STATEMENT
63 yo female PMhx HTN, HLD, prediabetes presents ED complaining of worsening left knee pain for the last month.  States she is followed with an orthopedic in the past regarding knee pain and was told she had severe arthritis, received gel injections of the knee, last of which was this past June which did relieve pain.  Over the last 2 days has noticed worsening pain unrelieved with Tylenol at home prompting ED visit today.  States she went to a "vascular specialist" on Friday who recommended possible injections but patient needs authorization and is awaiting that.. Patient was able to drive herself here today and is able to bear weight on the leg though this does worsen pain.  Does feel the knee appears swollen to her as well.  Denies any trauma, fall, fever/chills, numbness/tingling, weakness, recent travel, calf pain, shortness of breath, back pain.

## 2024-10-03 ENCOUNTER — APPOINTMENT (OUTPATIENT)
Dept: ORTHOPEDIC SURGERY | Facility: CLINIC | Age: 64
End: 2024-10-03

## 2024-10-03 VITALS
WEIGHT: 230 LBS | DIASTOLIC BLOOD PRESSURE: 110 MMHG | HEART RATE: 86 BPM | SYSTOLIC BLOOD PRESSURE: 153 MMHG | BODY MASS INDEX: 34.86 KG/M2 | HEIGHT: 68 IN | OXYGEN SATURATION: 98 %

## 2024-10-03 DIAGNOSIS — M17.11 UNILATERAL PRIMARY OSTEOARTHRITIS, RIGHT KNEE: ICD-10-CM

## 2024-10-03 DIAGNOSIS — M76.62 ACHILLES TENDINITIS, LEFT LEG: ICD-10-CM

## 2024-10-03 DIAGNOSIS — M17.0 BILATERAL PRIMARY OSTEOARTHRITIS OF KNEE: ICD-10-CM

## 2024-10-03 PROCEDURE — 99214 OFFICE O/P EST MOD 30 MIN: CPT | Mod: 25

## 2024-10-03 PROCEDURE — 20610 DRAIN/INJ JOINT/BURSA W/O US: CPT | Mod: 50

## 2024-10-03 RX ORDER — DICLOFENAC SODIUM 75 MG/1
75 TABLET, DELAYED RELEASE ORAL
Qty: 60 | Refills: 3 | Status: ACTIVE | COMMUNITY
Start: 2024-10-03 | End: 1900-01-01

## 2024-10-03 NOTE — PHYSICAL EXAM
[de-identified] : This patient did extremely well after previous injections with gel 1 they have slowly wore off and she feels that at this time she is having more problem with her knees but feels that if she gets as good result as she did from the gel 1 previously that she definitely wants to stay on conservative measures.  It has not been 6 months since her gel injection so therefore I feel that she would benefit from injection with cortisone and HTH knee she does have bilateral knee arthritis. Motion Right Knee has 0 to 120 degrees of motion.  She has good medial lateral and anterior posterior stability.  She does not have a major effusion and no Baker's cyst.  The hyaluronic acid injections have worn off and she again is having significant discomfort over the medial joint line and some patellofemoral discomfort she has a positive medial Steinmann test.    Her left knee has 0 to 120 degrees of motion with good medial lateral and anterior posterior stability.  She has pain over the medial joint line and a positive medial Steinmann test as well as discomfort with compression of her patella.  She has similar symptoms in both knees.    She has some swelling around her left Achilles tendon it is intact however clinically she has Achilles tendinitis.

## 2024-10-03 NOTE — PROCEDURE
[de-identified] : Procedure Note:  Anatomic Location:  Right  Knee  Diagnosis:  Arthritis  Procedure:  Injection of 2cc  of Marcaine 0.25% plain and Kenalog 40, 1 cc  Local Spray: Ethyl Chloride.   Patient has consented for the procedure.  Injection  through a lateral parapatella approach.  Patient tolerated the procedure well.    Procedure Note:  Anatomic Location:  Left Knee  Diagnosis:  Arthritis  Procedure:  Injection of 2cc  of Marcaine 0.25% plain and Kenalog 40, 1 cc  Local Spray: Ethyl Chloride.   Patient has consented for the procedure.  Injection  through a lateral parapatella approach.  Patient tolerated the procedure well.

## 2024-10-03 NOTE — HISTORY OF PRESENT ILLNESS
[de-identified] : Ms. ALIVIA MAYER  is a 64 year old female who presents to the office for a follow-up visit.   She went to the ER on Sunday because of left knee pain and swelling.  At the ER she was given an anti-inflammatory and Tylenol which helped.  Now, she is taking meloxicam but that does not help.  Her knee has improved, but she is complaining of left ankle pain and swelling.  She is seeing Dr. Alonso for her ankle.

## 2024-10-03 NOTE — DISCUSSION/SUMMARY
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  She definitely wants to stay on conservative measures at this time Long discussion was had with the patient about the conservative treatment and that she may in the future need total knee replacements of which she is well aware at this time she would like to stay on conservative measures and was given the cortisone injection.  She also has acute Achilles tendinitis in the left heel.  Although she has had some Crohn disease she will try diclofenac for the next a week and until she sees foot and ankle surgeon Dr. Parker.  She is also going to have physical therapy which she will do physical therapy is with her knees and with her left Achilles and may have aquatic therapy.  The risk of anti-inflammatory medicines were discussed with the patient.  If there is any sign of rash or allergy it should be stopped immediately.  There is a risk of causing GI irritation which would also be a reason to stop the medication.  Some of the anti-inflammatory medications can cause a rise in blood pressure.  If this is an issue then please speak to her internist.  Injection risk factors  The possible risks discussed include pain, swelling, heat, muscle stiffness and fulness and rare infection, allergy, and face flushing.      A total of  34  minutes were spent in direct care in this patient , including  reviewing previous notes, counseling the patient,  ordering tests , reviewing the medication plan, and documenting the findings in the note. This excludes other separate billable services.    She is aware of her options at this time and at this time would like to stay on conservative measures but realizes in the future she may well benefit from total knee replacement We discussed the use of diet and dietary therapy in the management of Crohns Disease. In order to safely and effectively implement the dietary intervention, follow up with our dietician team was recommended.  Time spent with patient 30 minutes

## 2024-10-08 ENCOUNTER — APPOINTMENT (OUTPATIENT)
Dept: UROGYNECOLOGY | Facility: CLINIC | Age: 64
End: 2024-10-08
Payer: MEDICAID

## 2024-10-08 VITALS
HEIGHT: 68 IN | DIASTOLIC BLOOD PRESSURE: 81 MMHG | WEIGHT: 230 LBS | HEART RATE: 67 BPM | BODY MASS INDEX: 34.86 KG/M2 | SYSTOLIC BLOOD PRESSURE: 165 MMHG

## 2024-10-08 DIAGNOSIS — Z86.39 PERSONAL HISTORY OF OTHER ENDOCRINE, NUTRITIONAL AND METABOLIC DISEASE: ICD-10-CM

## 2024-10-08 DIAGNOSIS — Z82.49 FAMILY HISTORY OF ISCHEMIC HEART DISEASE AND OTHER DISEASES OF THE CIRCULATORY SYSTEM: ICD-10-CM

## 2024-10-08 DIAGNOSIS — N95.8 OTHER SPECIFIED MENOPAUSAL AND PERIMENOPAUSAL DISORDERS: ICD-10-CM

## 2024-10-08 DIAGNOSIS — R35.0 FREQUENCY OF MICTURITION: ICD-10-CM

## 2024-10-08 DIAGNOSIS — N81.11 CYSTOCELE, MIDLINE: ICD-10-CM

## 2024-10-08 DIAGNOSIS — R39.15 URGENCY OF URINATION: ICD-10-CM

## 2024-10-08 DIAGNOSIS — N39.41 URGE INCONTINENCE: ICD-10-CM

## 2024-10-08 DIAGNOSIS — Z86.79 PERSONAL HISTORY OF OTHER DISEASES OF THE CIRCULATORY SYSTEM: ICD-10-CM

## 2024-10-08 LAB
BILIRUB UR QL STRIP: NORMAL
CLARITY UR: CLEAR
COLLECTION METHOD: NORMAL
GLUCOSE UR-MCNC: NORMAL
HCG UR QL: 0.2 EU/DL
HGB UR QL STRIP.AUTO: ABNORMAL
KETONES UR-MCNC: NORMAL
LEUKOCYTE ESTERASE UR QL STRIP: NORMAL
NITRITE UR QL STRIP: NORMAL
PH UR STRIP: 8.5
PROT UR STRIP-MCNC: NORMAL
SP GR UR STRIP: 1.01

## 2024-10-08 PROCEDURE — 99459 PELVIC EXAMINATION: CPT

## 2024-10-08 PROCEDURE — 51701 INSERT BLADDER CATHETER: CPT

## 2024-10-08 PROCEDURE — 99204 OFFICE O/P NEW MOD 45 MIN: CPT | Mod: 25

## 2024-10-08 RX ORDER — MIRABEGRON 50 MG/1
50 TABLET, EXTENDED RELEASE ORAL
Qty: 30 | Refills: 3 | Status: ACTIVE | COMMUNITY
Start: 2024-10-08 | End: 1900-01-01

## 2024-10-08 RX ORDER — ESTRADIOL 10 UG/1
10 INSERT VAGINAL
Qty: 18 | Refills: 1 | Status: ACTIVE | COMMUNITY
Start: 2024-10-08 | End: 1900-01-01

## 2024-10-08 RX ORDER — ESTRADIOL 10 UG/1
10 TABLET, FILM COATED VAGINAL
Qty: 24 | Refills: 3 | Status: ACTIVE | COMMUNITY
Start: 2024-10-08 | End: 1900-01-01

## 2024-10-09 PROBLEM — Z86.39 HISTORY OF THYROID DISORDER: Status: RESOLVED | Noted: 2024-10-09 | Resolved: 2024-10-09

## 2024-10-09 PROBLEM — Z82.49 FAMILY HISTORY OF HYPERTENSION: Status: ACTIVE | Noted: 2024-10-09

## 2024-10-09 PROBLEM — Z86.79 HISTORY OF HYPERTENSION: Status: RESOLVED | Noted: 2024-10-09 | Resolved: 2024-10-09

## 2024-10-11 ENCOUNTER — NON-APPOINTMENT (OUTPATIENT)
Age: 64
End: 2024-10-11

## 2024-10-11 LAB — BACTERIA UR CULT: NORMAL

## 2024-10-14 ENCOUNTER — APPOINTMENT (OUTPATIENT)
Dept: ORTHOPEDIC SURGERY | Facility: CLINIC | Age: 64
End: 2024-10-14
Payer: MEDICAID

## 2024-10-14 DIAGNOSIS — M21.42 FLAT FOOT [PES PLANUS] (ACQUIRED), RIGHT FOOT: ICD-10-CM

## 2024-10-14 DIAGNOSIS — M77.8 OTHER ENTHESOPATHIES, NOT ELSEWHERE CLASSIFIED: ICD-10-CM

## 2024-10-14 DIAGNOSIS — M62.89 OTHER SPECIFIED DISORDERS OF MUSCLE: ICD-10-CM

## 2024-10-14 DIAGNOSIS — M92.62 JUVENILE OSTEOCHONDROSIS OF TARSUS, LEFT ANKLE: ICD-10-CM

## 2024-10-14 DIAGNOSIS — M67.88 OTHER SPECIFIED DISORDERS OF SYNOVIUM AND TENDON, OTHER SITE: ICD-10-CM

## 2024-10-14 DIAGNOSIS — M94.279 CHONDROMALACIA, UNSPECIFIED ANKLE AND JOINTS OF FOOT: ICD-10-CM

## 2024-10-14 DIAGNOSIS — M21.41 FLAT FOOT [PES PLANUS] (ACQUIRED), RIGHT FOOT: ICD-10-CM

## 2024-10-14 DIAGNOSIS — M19.072 PRIMARY OSTEOARTHRITIS, RIGHT ANKLE AND FOOT: ICD-10-CM

## 2024-10-14 DIAGNOSIS — M19.071 PRIMARY OSTEOARTHRITIS, RIGHT ANKLE AND FOOT: ICD-10-CM

## 2024-10-14 LAB
APPEARANCE: ABNORMAL
BACTERIA: NEGATIVE /HPF
BILIRUBIN URINE: NEGATIVE
BLOOD URINE: NEGATIVE
CAST: 1 /LPF
COLOR: YELLOW
EPITHELIAL CELLS: 2 /HPF
GLUCOSE QUALITATIVE U: NEGATIVE MG/DL
KETONES URINE: NEGATIVE MG/DL
LEUKOCYTE ESTERASE URINE: NEGATIVE
MICROSCOPIC-UA: NORMAL
NITRITE URINE: NEGATIVE
PH URINE: 8.5
PROTEIN URINE: 30 MG/DL
RED BLOOD CELLS URINE: 2 /HPF
REVIEW: NORMAL
SPECIFIC GRAVITY URINE: 1.02
UROBILINOGEN URINE: 0.2 MG/DL
WHITE BLOOD CELLS URINE: 1 /HPF

## 2024-10-14 PROCEDURE — 99213 OFFICE O/P EST LOW 20 MIN: CPT

## 2024-10-14 PROCEDURE — 73610 X-RAY EXAM OF ANKLE: CPT | Mod: 50

## 2024-11-06 ENCOUNTER — APPOINTMENT (OUTPATIENT)
Dept: GASTROENTEROLOGY | Facility: AMBULATORY MEDICAL SERVICES | Age: 64
End: 2024-11-06

## 2025-01-14 ENCOUNTER — APPOINTMENT (OUTPATIENT)
Dept: UROGYNECOLOGY | Facility: CLINIC | Age: 65
End: 2025-01-14

## 2025-02-12 RX ORDER — HYALURONATE SOD, CROSS-LINKED 30 MG/3 ML
30 SYRINGE (ML) INTRAARTICULAR
Qty: 2 | Refills: 0 | Status: ACTIVE | COMMUNITY
Start: 2025-02-12

## 2025-02-13 ENCOUNTER — APPOINTMENT (OUTPATIENT)
Dept: ORTHOPEDIC SURGERY | Facility: CLINIC | Age: 65
End: 2025-02-13

## 2025-02-13 VITALS — HEIGHT: 68 IN | WEIGHT: 230 LBS | BODY MASS INDEX: 34.86 KG/M2

## 2025-02-19 RX ORDER — HYALURONATE SODIUM, STABILIZED 88 MG/4 ML
88 SYRINGE (ML) INTRAARTICULAR
Qty: 2 | Refills: 0 | Status: ACTIVE | COMMUNITY
Start: 2025-02-19

## 2025-02-25 ENCOUNTER — APPOINTMENT (OUTPATIENT)
Dept: ORTHOPEDIC SURGERY | Facility: CLINIC | Age: 65
End: 2025-02-25

## 2025-02-25 VITALS — BODY MASS INDEX: 34.86 KG/M2 | HEIGHT: 68 IN | WEIGHT: 230 LBS

## 2025-02-25 DIAGNOSIS — M17.0 BILATERAL PRIMARY OSTEOARTHRITIS OF KNEE: ICD-10-CM

## 2025-02-25 DIAGNOSIS — M17.11 UNILATERAL PRIMARY OSTEOARTHRITIS, RIGHT KNEE: ICD-10-CM

## 2025-02-25 DIAGNOSIS — M17.12 UNILATERAL PRIMARY OSTEOARTHRITIS, LEFT KNEE: ICD-10-CM

## 2025-02-25 PROCEDURE — 20610 DRAIN/INJ JOINT/BURSA W/O US: CPT | Mod: 50

## 2025-02-25 PROCEDURE — 99214 OFFICE O/P EST MOD 30 MIN: CPT | Mod: 25

## 2025-02-25 RX ORDER — CELECOXIB 200 MG/1
200 CAPSULE ORAL DAILY
Qty: 90 | Refills: 2 | Status: ACTIVE | COMMUNITY
Start: 2025-02-25 | End: 1900-01-01

## 2025-03-25 ENCOUNTER — APPOINTMENT (OUTPATIENT)
Dept: ORTHOPEDIC SURGERY | Facility: CLINIC | Age: 65
End: 2025-03-25

## 2025-04-03 ENCOUNTER — APPOINTMENT (OUTPATIENT)
Dept: ORTHOPEDIC SURGERY | Facility: CLINIC | Age: 65
End: 2025-04-03
Payer: COMMERCIAL

## 2025-04-03 ENCOUNTER — APPOINTMENT (OUTPATIENT)
Dept: GASTROENTEROLOGY | Facility: CLINIC | Age: 65
End: 2025-04-03
Payer: COMMERCIAL

## 2025-04-03 VITALS
SYSTOLIC BLOOD PRESSURE: 144 MMHG | HEIGHT: 68 IN | TEMPERATURE: 97.4 F | HEART RATE: 85 BPM | BODY MASS INDEX: 34.86 KG/M2 | OXYGEN SATURATION: 96 % | WEIGHT: 230 LBS | DIASTOLIC BLOOD PRESSURE: 79 MMHG

## 2025-04-03 VITALS — BODY MASS INDEX: 34.86 KG/M2 | WEIGHT: 230 LBS | HEIGHT: 68 IN

## 2025-04-03 DIAGNOSIS — M17.12 UNILATERAL PRIMARY OSTEOARTHRITIS, LEFT KNEE: ICD-10-CM

## 2025-04-03 DIAGNOSIS — Z12.11 ENCOUNTER FOR SCREENING FOR MALIGNANT NEOPLASM OF COLON: ICD-10-CM

## 2025-04-03 DIAGNOSIS — K21.9 GASTRO-ESOPHAGEAL REFLUX DISEASE W/OUT ESOPHAGITIS: ICD-10-CM

## 2025-04-03 DIAGNOSIS — Z12.12 ENCOUNTER FOR SCREENING FOR MALIGNANT NEOPLASM OF COLON: ICD-10-CM

## 2025-04-03 DIAGNOSIS — M17.11 UNILATERAL PRIMARY OSTEOARTHRITIS, RIGHT KNEE: ICD-10-CM

## 2025-04-03 DIAGNOSIS — M17.0 BILATERAL PRIMARY OSTEOARTHRITIS OF KNEE: ICD-10-CM

## 2025-04-03 PROCEDURE — 99214 OFFICE O/P EST MOD 30 MIN: CPT

## 2025-04-03 PROCEDURE — 99213 OFFICE O/P EST LOW 20 MIN: CPT | Mod: 25

## 2025-04-03 PROCEDURE — 20610 DRAIN/INJ JOINT/BURSA W/O US: CPT | Mod: RT

## 2025-05-14 ENCOUNTER — APPOINTMENT (OUTPATIENT)
Dept: ORTHOPEDIC SURGERY | Facility: CLINIC | Age: 65
End: 2025-05-14
Payer: COMMERCIAL

## 2025-05-14 DIAGNOSIS — G89.29 PAIN IN UNSPECIFIED ANKLE AND JOINTS OF UNSPECIFIED FOOT: ICD-10-CM

## 2025-05-14 DIAGNOSIS — M17.0 BILATERAL PRIMARY OSTEOARTHRITIS OF KNEE: ICD-10-CM

## 2025-05-14 DIAGNOSIS — M17.12 UNILATERAL PRIMARY OSTEOARTHRITIS, LEFT KNEE: ICD-10-CM

## 2025-05-14 DIAGNOSIS — M25.579 PAIN IN UNSPECIFIED ANKLE AND JOINTS OF UNSPECIFIED FOOT: ICD-10-CM

## 2025-05-14 DIAGNOSIS — M17.11 UNILATERAL PRIMARY OSTEOARTHRITIS, RIGHT KNEE: ICD-10-CM

## 2025-05-14 PROCEDURE — 99213 OFFICE O/P EST LOW 20 MIN: CPT | Mod: 25

## 2025-05-14 PROCEDURE — 20610 DRAIN/INJ JOINT/BURSA W/O US: CPT | Mod: LT

## 2025-05-20 ENCOUNTER — RX RENEWAL (OUTPATIENT)
Age: 65
End: 2025-05-20

## 2025-05-28 ENCOUNTER — APPOINTMENT (OUTPATIENT)
Dept: OBGYN | Facility: CLINIC | Age: 65
End: 2025-05-28
Payer: COMMERCIAL

## 2025-05-28 VITALS
SYSTOLIC BLOOD PRESSURE: 164 MMHG | DIASTOLIC BLOOD PRESSURE: 84 MMHG | WEIGHT: 227 LBS | HEART RATE: 96 BPM | HEIGHT: 68 IN | BODY MASS INDEX: 34.4 KG/M2

## 2025-05-28 DIAGNOSIS — N76.4 ABSCESS OF VULVA: ICD-10-CM

## 2025-05-28 DIAGNOSIS — R30.0 DYSURIA: ICD-10-CM

## 2025-05-28 LAB
BILIRUB UR QL STRIP: NORMAL
CLARITY UR: CLEAR
COLLECTION METHOD: NORMAL
GLUCOSE UR-MCNC: NORMAL
HCG UR QL: 1 EU/DL
HGB UR QL STRIP.AUTO: NORMAL
KETONES UR-MCNC: NORMAL
LEUKOCYTE ESTERASE UR QL STRIP: NORMAL
NITRITE UR QL STRIP: NORMAL
PH UR STRIP: 7
PROT UR STRIP-MCNC: 100
SP GR UR STRIP: 1.01

## 2025-05-28 PROCEDURE — 99459 PELVIC EXAMINATION: CPT

## 2025-05-28 PROCEDURE — 99214 OFFICE O/P EST MOD 30 MIN: CPT

## 2025-05-28 RX ORDER — SULFAMETHOXAZOLE AND TRIMETHOPRIM 800; 160 MG/1; MG/1
800-160 TABLET ORAL TWICE DAILY
Qty: 20 | Refills: 0 | Status: ACTIVE | COMMUNITY
Start: 2025-05-28 | End: 1900-01-01

## 2025-06-02 LAB — BACTERIA UR CULT: ABNORMAL

## 2025-06-04 ENCOUNTER — APPOINTMENT (OUTPATIENT)
Dept: GASTROENTEROLOGY | Facility: AMBULATORY MEDICAL SERVICES | Age: 65
End: 2025-06-04
Payer: COMMERCIAL

## 2025-06-04 PROCEDURE — 45380 COLONOSCOPY AND BIOPSY: CPT | Mod: 33

## 2025-06-04 PROCEDURE — 43239 EGD BIOPSY SINGLE/MULTIPLE: CPT | Mod: 59

## 2025-07-03 ENCOUNTER — APPOINTMENT (OUTPATIENT)
Dept: GASTROENTEROLOGY | Facility: CLINIC | Age: 65
End: 2025-07-03
Payer: COMMERCIAL

## 2025-07-03 VITALS
HEART RATE: 76 BPM | OXYGEN SATURATION: 99 % | HEIGHT: 68 IN | DIASTOLIC BLOOD PRESSURE: 93 MMHG | TEMPERATURE: 98 F | SYSTOLIC BLOOD PRESSURE: 177 MMHG | BODY MASS INDEX: 33.19 KG/M2 | WEIGHT: 219 LBS

## 2025-07-03 PROBLEM — D12.2 ADENOMATOUS POLYP OF ASCENDING COLON: Status: ACTIVE | Noted: 2025-07-03

## 2025-07-03 PROCEDURE — 99214 OFFICE O/P EST MOD 30 MIN: CPT

## 2025-07-03 PROCEDURE — G2211 COMPLEX E/M VISIT ADD ON: CPT

## 2025-07-03 RX ORDER — FAMOTIDINE 20 MG/1
20 TABLET, FILM COATED ORAL TWICE DAILY
Qty: 180 | Refills: 3 | Status: ACTIVE | COMMUNITY
Start: 2025-07-03 | End: 1900-01-01

## 2025-07-23 ENCOUNTER — APPOINTMENT (OUTPATIENT)
Dept: ORTHOPEDIC SURGERY | Facility: CLINIC | Age: 65
End: 2025-07-23
Payer: COMMERCIAL

## 2025-07-23 DIAGNOSIS — M17.11 UNILATERAL PRIMARY OSTEOARTHRITIS, RIGHT KNEE: ICD-10-CM

## 2025-07-23 DIAGNOSIS — M17.12 UNILATERAL PRIMARY OSTEOARTHRITIS, LEFT KNEE: ICD-10-CM

## 2025-07-23 DIAGNOSIS — M17.0 BILATERAL PRIMARY OSTEOARTHRITIS OF KNEE: ICD-10-CM

## 2025-07-23 PROCEDURE — 99213 OFFICE O/P EST LOW 20 MIN: CPT

## 2025-07-23 RX ORDER — DICLOFENAC SODIUM 50 MG/1
50 TABLET, DELAYED RELEASE ORAL
Qty: 90 | Refills: 2 | Status: ACTIVE | COMMUNITY
Start: 2025-07-23 | End: 1900-01-01

## 2025-07-23 NOTE — ASU PREOP CHECKLIST - RESPIRATORY RATE (BREATHS/MIN)
How Severe Is Your Skin Lesion?: mild Has Your Skin Lesion Been Treated?: not been treated Is This A New Presentation, Or A Follow-Up?: Skin Lesion 16

## 2025-09-04 ENCOUNTER — APPOINTMENT (OUTPATIENT)
Dept: ORTHOPEDIC SURGERY | Facility: CLINIC | Age: 65
End: 2025-09-04

## 2025-09-04 RX ORDER — HYALURONATE SODIUM, STABILIZED 88 MG/4 ML
88 SYRINGE (ML) INTRAARTICULAR
Qty: 2 | Refills: 0 | Status: DISCONTINUED | OUTPATIENT
Start: 2025-09-04 | End: 2025-09-04

## 2025-09-08 RX ORDER — HYALURONATE SODIUM, STABILIZED 88 MG/4 ML
88 SYRINGE (ML) INTRAARTICULAR
Qty: 2 | Refills: 0 | Status: ACTIVE | OUTPATIENT
Start: 2025-09-04

## 2025-09-09 RX ORDER — HYALURONATE SODIUM, STABILIZED 88 MG/4 ML
88 SYRINGE (ML) INTRAARTICULAR
Qty: 2 | Refills: 0 | Status: ACTIVE | OUTPATIENT
Start: 2025-09-09